# Patient Record
Sex: FEMALE | Race: BLACK OR AFRICAN AMERICAN | Employment: UNEMPLOYED | URBAN - METROPOLITAN AREA
[De-identification: names, ages, dates, MRNs, and addresses within clinical notes are randomized per-mention and may not be internally consistent; named-entity substitution may affect disease eponyms.]

---

## 2017-05-08 ENCOUNTER — HOSPITAL ENCOUNTER (OUTPATIENT)
Dept: ULTRASOUND IMAGING | Age: 42
Discharge: HOME | End: 2017-05-08
Attending: NURSE PRACTITIONER | Admitting: NURSE PRACTITIONER

## 2017-05-08 DIAGNOSIS — R10.11 ABDOMINAL PAIN, RIGHT UPPER QUADRANT: ICD-10-CM

## 2017-06-16 ENCOUNTER — HOSPITAL ENCOUNTER (OUTPATIENT)
Dept: GENERAL RADIOLOGY | Age: 42
Discharge: HOME | End: 2017-06-16
Attending: INTERNAL MEDICINE | Admitting: INTERNAL MEDICINE

## 2017-06-16 LAB
ALBUMIN SERPL-MCNC: 3.9 GM/DL (ref 3.4–5)
ALP BLD-CCNC: 67 IU/L (ref 40–128)
ALT SERPL-CCNC: 15 U/L (ref 10–40)
AMYLASE: 83 U/L (ref 25–115)
ANION GAP SERPL CALCULATED.3IONS-SCNC: 12 MMOL/L (ref 4–16)
AST SERPL-CCNC: 20 IU/L (ref 15–37)
BASOPHILS ABSOLUTE: 0 K/CU MM
BASOPHILS RELATIVE PERCENT: 0.6 % (ref 0–1)
BILIRUB SERPL-MCNC: 0.2 MG/DL (ref 0–1)
BUN BLDV-MCNC: 7 MG/DL (ref 6–23)
C-REACTIVE PROTEIN, HIGH SENSITIVITY: 10.8 MG/L
CALCIUM SERPL-MCNC: 9.8 MG/DL (ref 8.3–10.6)
CHLORIDE BLD-SCNC: 102 MMOL/L (ref 99–110)
CO2: 26 MMOL/L (ref 21–32)
CREAT SERPL-MCNC: 0.9 MG/DL (ref 0.6–1.1)
DIFFERENTIAL TYPE: ABNORMAL
EOSINOPHILS ABSOLUTE: 0.1 K/CU MM
EOSINOPHILS RELATIVE PERCENT: 0.7 % (ref 0–3)
ERYTHROCYTE SEDIMENTATION RATE: 38 MM/HR (ref 0–20)
FERRITIN: 28 NG/ML (ref 15–150)
GFR AFRICAN AMERICAN: >60 ML/MIN/1.73M2
GFR NON-AFRICAN AMERICAN: >60 ML/MIN/1.73M2
GLUCOSE BLD-MCNC: 93 MG/DL (ref 70–140)
HCT VFR BLD CALC: 38 % (ref 37–47)
HEMOGLOBIN: 12.5 GM/DL (ref 12.5–16)
IGM,SERUM: 48 MG/DL (ref 62–277)
IMMATURE NEUTROPHIL %: 0.3 % (ref 0–0.43)
IRON: 70 UG/DL (ref 37–145)
LIPASE: 14 IU/L (ref 13–60)
LYMPHOCYTES ABSOLUTE: 3.8 K/CU MM
LYMPHOCYTES RELATIVE PERCENT: 52.6 % (ref 24–44)
MCH RBC QN AUTO: 31.5 PG (ref 27–31)
MCHC RBC AUTO-ENTMCNC: 32.9 % (ref 32–36)
MCV RBC AUTO: 95.7 FL (ref 78–100)
MONOCYTES ABSOLUTE: 0.3 K/CU MM
MONOCYTES RELATIVE PERCENT: 4.2 % (ref 0–4)
NUCLEATED RBC %: 0 %
PCT TRANSFERRIN: 27 % (ref 10–44)
PDW BLD-RTO: 13.1 % (ref 11.7–14.9)
PLATELET # BLD: 274 K/CU MM (ref 140–440)
PMV BLD AUTO: 11.8 FL (ref 7.5–11.1)
POTASSIUM SERPL-SCNC: 4.5 MMOL/L (ref 3.5–5.1)
RBC # BLD: 3.97 M/CU MM (ref 4.2–5.4)
SEGMENTED NEUTROPHILS ABSOLUTE COUNT: 3 K/CU MM
SEGMENTED NEUTROPHILS RELATIVE PERCENT: 41.6 % (ref 36–66)
SODIUM BLD-SCNC: 140 MMOL/L (ref 135–145)
TOTAL IMMATURE NEUTOROPHIL: 0.02 K/CU MM
TOTAL IRON BINDING CAPACITY: 259 UG/DL (ref 250–450)
TOTAL NUCLEATED RBC: 0 K/CU MM
TOTAL PROTEIN: 7 GM/DL (ref 6.4–8.2)
UNSATURATED IRON BINDING CAPACITY: 189 UG/DL (ref 110–370)
WBC # BLD: 7.2 K/CU MM (ref 4–10.5)

## 2017-06-19 LAB — TISSUE TRANSGLUTAMINASE ANTIBODY: 2

## 2017-06-29 ENCOUNTER — HOSPITAL ENCOUNTER (OUTPATIENT)
Dept: NUCLEAR MEDICINE | Age: 42
Discharge: HOME | End: 2017-06-29
Attending: NURSE PRACTITIONER | Admitting: NURSE PRACTITIONER

## 2017-06-29 DIAGNOSIS — R10.9 ABDOMINAL PAIN, UNSPECIFIED SITE: ICD-10-CM

## 2017-06-29 RX ADMIN — Medication 6 MILLICURIE: at 10:05

## 2017-07-10 ENCOUNTER — HOSPITAL ENCOUNTER (OUTPATIENT)
Dept: GENERAL RADIOLOGY | Age: 42
Discharge: HOME | End: 2017-07-10
Attending: INTERNAL MEDICINE | Admitting: INTERNAL MEDICINE

## 2017-07-10 LAB
ALBUMIN SERPL-MCNC: 3.7 GM/DL (ref 3.4–5)
ALP BLD-CCNC: 69 IU/L (ref 40–128)
ALT SERPL-CCNC: 13 U/L (ref 10–40)
ANION GAP SERPL CALCULATED.3IONS-SCNC: 13 MMOL/L (ref 4–16)
AST SERPL-CCNC: 19 IU/L (ref 15–37)
BASOPHILS ABSOLUTE: 0 K/CU MM
BASOPHILS RELATIVE PERCENT: 0.4 % (ref 0–1)
BILIRUB SERPL-MCNC: 0.2 MG/DL (ref 0–1)
BUN BLDV-MCNC: 6 MG/DL (ref 6–23)
CALCIUM SERPL-MCNC: 9.4 MG/DL (ref 8.3–10.6)
CHLORIDE BLD-SCNC: 100 MMOL/L (ref 99–110)
CHOLESTEROL: 198 MG/DL
CO2: 26 MMOL/L (ref 21–32)
CREAT SERPL-MCNC: 0.8 MG/DL (ref 0.6–1.1)
DIFFERENTIAL TYPE: ABNORMAL
EOSINOPHILS ABSOLUTE: 0.1 K/CU MM
EOSINOPHILS RELATIVE PERCENT: 1.2 % (ref 0–3)
GFR AFRICAN AMERICAN: >60 ML/MIN/1.73M2
GFR NON-AFRICAN AMERICAN: >60 ML/MIN/1.73M2
GLUCOSE FASTING: 87 MG/DL (ref 70–99)
HCT VFR BLD CALC: 38.4 % (ref 37–47)
HDLC SERPL-MCNC: 85 MG/DL
HEMOGLOBIN: 12.5 GM/DL (ref 12.5–16)
IMMATURE NEUTROPHIL %: 0.1 % (ref 0–0.43)
LDL CHOLESTEROL CALCULATED: 98 MG/DL
LYMPHOCYTES ABSOLUTE: 3.7 K/CU MM
LYMPHOCYTES RELATIVE PERCENT: 54.3 % (ref 24–44)
MCH RBC QN AUTO: 31.4 PG (ref 27–31)
MCHC RBC AUTO-ENTMCNC: 32.6 % (ref 32–36)
MCV RBC AUTO: 96.5 FL (ref 78–100)
MONOCYTES ABSOLUTE: 0.3 K/CU MM
MONOCYTES RELATIVE PERCENT: 4.6 % (ref 0–4)
NUCLEATED RBC %: 0 %
PDW BLD-RTO: 13.3 % (ref 11.7–14.9)
PLATELET # BLD: 267 K/CU MM (ref 140–440)
PMV BLD AUTO: 11.2 FL (ref 7.5–11.1)
POTASSIUM SERPL-SCNC: 4.1 MMOL/L (ref 3.5–5.1)
RBC # BLD: 3.98 M/CU MM (ref 4.2–5.4)
SEGMENTED NEUTROPHILS ABSOLUTE COUNT: 2.7 K/CU MM
SEGMENTED NEUTROPHILS RELATIVE PERCENT: 39.4 % (ref 36–66)
SODIUM BLD-SCNC: 139 MMOL/L (ref 135–145)
T4 FREE: 0.93 NG/DL (ref 0.9–1.8)
TOTAL IMMATURE NEUTOROPHIL: 0.01 K/CU MM
TOTAL NUCLEATED RBC: 0 K/CU MM
TOTAL PROTEIN: 6.9 GM/DL (ref 6.4–8.2)
TRIGL SERPL-MCNC: 73 MG/DL
TSH HIGH SENSITIVITY: 0.56 UIU/ML (ref 0.27–4.2)
WBC # BLD: 6.8 K/CU MM (ref 4–10.5)

## 2017-08-09 ENCOUNTER — HOSPITAL ENCOUNTER (OUTPATIENT)
Dept: CARDIOLOGY | Age: 42
Discharge: HOME | End: 2017-08-09
Attending: INTERNAL MEDICINE | Admitting: INTERNAL MEDICINE

## 2017-08-09 DIAGNOSIS — R07.2 CHEST PAIN, PRECORDIAL: ICD-10-CM

## 2017-08-09 DIAGNOSIS — R07.2 PRECORDIAL PAIN: ICD-10-CM

## 2017-08-09 LAB
LV EF: 50 %
LV EF: 70 %
LVEF MODALITY: NORMAL
LVEF MODALITY: NORMAL

## 2017-08-09 RX ORDER — SODIUM CHLORIDE 0.9 % (FLUSH) 0.9 %
10 SYRINGE (ML) INJECTION PRN
Status: DISCONTINUED | OUTPATIENT
Start: 2017-08-09 | End: 2017-08-10 | Stop reason: HOSPADM

## 2017-08-09 RX ADMIN — Medication 30 MILLICURIE: at 11:58

## 2017-08-09 RX ADMIN — Medication 10 ML: at 11:54

## 2017-08-09 RX ADMIN — Medication 10 MILLICURIE: at 11:58

## 2018-09-12 ENCOUNTER — HOSPITAL ENCOUNTER (OUTPATIENT)
Dept: OTHER | Age: 43
Discharge: HOME | End: 2018-09-12
Attending: SURGERY | Admitting: CLINIC/CENTER

## 2018-09-15 LAB
CHLAMYDIA TRACHOMATIS AMPLIFIED DET: NEGATIVE
N GONORRHOEAE AMPLIFIED DET: NEGATIVE
T. VAGINALIS BY TMA: NEGATIVE

## 2019-01-08 ENCOUNTER — APPOINTMENT (OUTPATIENT)
Dept: GENERAL RADIOLOGY | Age: 44
End: 2019-01-08
Payer: COMMERCIAL

## 2019-01-08 ENCOUNTER — APPOINTMENT (OUTPATIENT)
Dept: CT IMAGING | Age: 44
End: 2019-01-08
Payer: COMMERCIAL

## 2019-01-08 ENCOUNTER — HOSPITAL ENCOUNTER (EMERGENCY)
Age: 44
Discharge: HOME | End: 2019-01-08
Attending: EMERGENCY MEDICINE
Payer: COMMERCIAL

## 2019-01-08 VITALS
DIASTOLIC BLOOD PRESSURE: 90 MMHG | TEMPERATURE: 98.2 F | HEART RATE: 86 BPM | HEIGHT: 66 IN | SYSTOLIC BLOOD PRESSURE: 141 MMHG | WEIGHT: 250 LBS | BODY MASS INDEX: 40.18 KG/M2 | OXYGEN SATURATION: 100 % | RESPIRATION RATE: 17 BRPM

## 2019-01-08 DIAGNOSIS — R79.0 LOW MAGNESIUM LEVEL: ICD-10-CM

## 2019-01-08 DIAGNOSIS — Q45.3 PANCREATIC ABNORMALITY: ICD-10-CM

## 2019-01-08 DIAGNOSIS — R10.84 GENERALIZED ABDOMINAL PAIN: ICD-10-CM

## 2019-01-08 DIAGNOSIS — M79.10 GENERALIZED MUSCLE ACHE: Primary | ICD-10-CM

## 2019-01-08 LAB
ALBUMIN SERPL-MCNC: 3.4 GM/DL (ref 3.4–5)
ALP BLD-CCNC: 81 IU/L (ref 40–129)
ALT SERPL-CCNC: 9 U/L (ref 10–40)
ANION GAP SERPL CALCULATED.3IONS-SCNC: 13 MMOL/L (ref 4–16)
AST SERPL-CCNC: 14 IU/L (ref 15–37)
BACTERIA: NEGATIVE /HPF
BASOPHILS ABSOLUTE: 0 K/CU MM
BASOPHILS RELATIVE PERCENT: 0.4 % (ref 0–1)
BILIRUB SERPL-MCNC: 0.1 MG/DL (ref 0–1)
BILIRUBIN URINE: NEGATIVE MG/DL
BLOOD, URINE: NEGATIVE
BUN BLDV-MCNC: 8 MG/DL (ref 6–23)
CALCIUM SERPL-MCNC: 9 MG/DL (ref 8.3–10.6)
CHLORIDE BLD-SCNC: 103 MMOL/L (ref 99–110)
CLARITY: CLEAR
CO2: 22 MMOL/L (ref 21–32)
COLOR: YELLOW
CREAT SERPL-MCNC: 0.7 MG/DL (ref 0.6–1.1)
DIFFERENTIAL TYPE: ABNORMAL
EKG ATRIAL RATE: 93 BPM
EKG DIAGNOSIS: NORMAL
EKG P AXIS: 36 DEGREES
EKG P-R INTERVAL: 140 MS
EKG Q-T INTERVAL: 376 MS
EKG QRS DURATION: 72 MS
EKG QTC CALCULATION (BAZETT): 467 MS
EKG R AXIS: 21 DEGREES
EKG T AXIS: 10 DEGREES
EKG VENTRICULAR RATE: 93 BPM
EOSINOPHILS ABSOLUTE: 0.1 K/CU MM
EOSINOPHILS RELATIVE PERCENT: 1.1 % (ref 0–3)
GFR AFRICAN AMERICAN: >60 ML/MIN/1.73M2
GFR NON-AFRICAN AMERICAN: >60 ML/MIN/1.73M2
GLUCOSE BLD-MCNC: 88 MG/DL (ref 70–99)
GLUCOSE, URINE: NEGATIVE MG/DL
HCG QUALITATIVE: NEGATIVE
HCT VFR BLD CALC: 40.7 % (ref 37–47)
HEMOGLOBIN: 12.5 GM/DL (ref 12.5–16)
IMMATURE NEUTROPHIL %: 0.3 % (ref 0–0.43)
KETONES, URINE: NEGATIVE MG/DL
LEUKOCYTE ESTERASE, URINE: NEGATIVE
LIPASE: 14 IU/L (ref 13–60)
LYMPHOCYTES ABSOLUTE: 4 K/CU MM
LYMPHOCYTES RELATIVE PERCENT: 52.8 % (ref 24–44)
MAGNESIUM: 1.6 MG/DL (ref 1.8–2.4)
MCH RBC QN AUTO: 30.4 PG (ref 27–31)
MCHC RBC AUTO-ENTMCNC: 30.7 % (ref 32–36)
MCV RBC AUTO: 99 FL (ref 78–100)
MONOCYTES ABSOLUTE: 0.3 K/CU MM
MONOCYTES RELATIVE PERCENT: 4.3 % (ref 0–4)
MUCUS: ABNORMAL HPF
NITRITE URINE, QUANTITATIVE: NEGATIVE
NUCLEATED RBC %: 0 %
PDW BLD-RTO: 12.9 % (ref 11.7–14.9)
PH, URINE: 7 (ref 5–8)
PLATELET # BLD: 321 K/CU MM (ref 140–440)
PMV BLD AUTO: 10.5 FL (ref 7.5–11.1)
POTASSIUM SERPL-SCNC: 3.6 MMOL/L (ref 3.5–5.1)
PROTEIN UA: 30 MG/DL
RAPID INFLUENZA  B AGN: NEGATIVE
RAPID INFLUENZA A AGN: NEGATIVE
RBC # BLD: 4.11 M/CU MM (ref 4.2–5.4)
RBC URINE: <1 /HPF (ref 0–6)
SEGMENTED NEUTROPHILS ABSOLUTE COUNT: 3.1 K/CU MM
SEGMENTED NEUTROPHILS RELATIVE PERCENT: 41.1 % (ref 36–66)
SODIUM BLD-SCNC: 138 MMOL/L (ref 135–145)
SPECIFIC GRAVITY UA: 1.02 (ref 1–1.03)
SQUAMOUS EPITHELIAL: <1 /HPF
TOTAL CK: 54 IU/L (ref 26–140)
TOTAL IMMATURE NEUTOROPHIL: 0.02 K/CU MM
TOTAL NUCLEATED RBC: 0 K/CU MM
TOTAL PROTEIN: 7.2 GM/DL (ref 6.4–8.2)
TRICHOMONAS: ABNORMAL /HPF
TROPONIN T: <0.01 NG/ML
UROBILINOGEN, URINE: NORMAL MG/DL (ref 0.2–1)
WBC # BLD: 7.5 K/CU MM (ref 4–10.5)
WBC UA: <1 /HPF (ref 0–5)

## 2019-01-08 PROCEDURE — 96365 THER/PROPH/DIAG IV INF INIT: CPT

## 2019-01-08 PROCEDURE — 2580000003 HC RX 258: Performed by: EMERGENCY MEDICINE

## 2019-01-08 PROCEDURE — 83735 ASSAY OF MAGNESIUM: CPT

## 2019-01-08 PROCEDURE — 82550 ASSAY OF CK (CPK): CPT

## 2019-01-08 PROCEDURE — 6370000000 HC RX 637 (ALT 250 FOR IP): Performed by: EMERGENCY MEDICINE

## 2019-01-08 PROCEDURE — 6360000002 HC RX W HCPCS: Performed by: EMERGENCY MEDICINE

## 2019-01-08 PROCEDURE — 84703 CHORIONIC GONADOTROPIN ASSAY: CPT

## 2019-01-08 PROCEDURE — 74176 CT ABD & PELVIS W/O CONTRAST: CPT

## 2019-01-08 PROCEDURE — 84484 ASSAY OF TROPONIN QUANT: CPT

## 2019-01-08 PROCEDURE — 96375 TX/PRO/DX INJ NEW DRUG ADDON: CPT

## 2019-01-08 PROCEDURE — 93005 ELECTROCARDIOGRAM TRACING: CPT | Performed by: EMERGENCY MEDICINE

## 2019-01-08 PROCEDURE — 36415 COLL VENOUS BLD VENIPUNCTURE: CPT

## 2019-01-08 PROCEDURE — 87804 INFLUENZA ASSAY W/OPTIC: CPT

## 2019-01-08 PROCEDURE — 96361 HYDRATE IV INFUSION ADD-ON: CPT

## 2019-01-08 PROCEDURE — C9113 INJ PANTOPRAZOLE SODIUM, VIA: HCPCS | Performed by: EMERGENCY MEDICINE

## 2019-01-08 PROCEDURE — 93010 ELECTROCARDIOGRAM REPORT: CPT | Performed by: INTERNAL MEDICINE

## 2019-01-08 PROCEDURE — 83690 ASSAY OF LIPASE: CPT

## 2019-01-08 PROCEDURE — 71046 X-RAY EXAM CHEST 2 VIEWS: CPT

## 2019-01-08 PROCEDURE — 81001 URINALYSIS AUTO W/SCOPE: CPT

## 2019-01-08 PROCEDURE — 80053 COMPREHEN METABOLIC PANEL: CPT

## 2019-01-08 PROCEDURE — 85025 COMPLETE CBC W/AUTO DIFF WBC: CPT

## 2019-01-08 PROCEDURE — 99285 EMERGENCY DEPT VISIT HI MDM: CPT

## 2019-01-08 RX ORDER — FEXOFENADINE HCL 180 MG/1
180 TABLET ORAL DAILY
COMMUNITY

## 2019-01-08 RX ORDER — DICYCLOMINE HYDROCHLORIDE 10 MG/1
10 CAPSULE ORAL 4 TIMES DAILY PRN
Qty: 20 CAPSULE | Refills: 0 | Status: SHIPPED | OUTPATIENT
Start: 2019-01-08 | End: 2019-09-12 | Stop reason: ALTCHOICE

## 2019-01-08 RX ORDER — 0.9 % SODIUM CHLORIDE 0.9 %
1000 INTRAVENOUS SOLUTION INTRAVENOUS ONCE
Status: COMPLETED | OUTPATIENT
Start: 2019-01-08 | End: 2019-01-08

## 2019-01-08 RX ORDER — MAGNESIUM SULFATE IN WATER 40 MG/ML
2 INJECTION, SOLUTION INTRAVENOUS ONCE
Status: COMPLETED | OUTPATIENT
Start: 2019-01-08 | End: 2019-01-08

## 2019-01-08 RX ORDER — RANITIDINE 150 MG/1
150 TABLET ORAL 2 TIMES DAILY
COMMUNITY

## 2019-01-08 RX ORDER — PANTOPRAZOLE SODIUM 40 MG/10ML
40 INJECTION, POWDER, LYOPHILIZED, FOR SOLUTION INTRAVENOUS ONCE
Status: COMPLETED | OUTPATIENT
Start: 2019-01-08 | End: 2019-01-08

## 2019-01-08 RX ORDER — NAPROXEN 500 MG/1
500 TABLET ORAL 2 TIMES DAILY PRN
Qty: 20 TABLET | Refills: 0 | Status: SHIPPED | OUTPATIENT
Start: 2019-01-08 | End: 2020-02-11

## 2019-01-08 RX ORDER — KETOROLAC TROMETHAMINE 30 MG/ML
30 INJECTION, SOLUTION INTRAMUSCULAR; INTRAVENOUS ONCE
Status: COMPLETED | OUTPATIENT
Start: 2019-01-08 | End: 2019-01-08

## 2019-01-08 RX ORDER — GUAIFENESIN/DEXTROMETHORPHAN 100-10MG/5
5 SYRUP ORAL ONCE
Status: COMPLETED | OUTPATIENT
Start: 2019-01-08 | End: 2019-01-08

## 2019-01-08 RX ORDER — METHYLPREDNISOLONE 4 MG/1
4 TABLET ORAL SEE ADMIN INSTRUCTIONS
COMMUNITY
End: 2019-09-12 | Stop reason: ALTCHOICE

## 2019-01-08 RX ADMIN — MAGNESIUM SULFATE HEPTAHYDRATE 2 G: 40 INJECTION, SOLUTION INTRAVENOUS at 14:59

## 2019-01-08 RX ADMIN — SODIUM CHLORIDE 1000 ML: 9 INJECTION, SOLUTION INTRAVENOUS at 13:02

## 2019-01-08 RX ADMIN — PANTOPRAZOLE SODIUM 40 MG: 40 INJECTION, POWDER, FOR SOLUTION INTRAVENOUS at 13:02

## 2019-01-08 RX ADMIN — GUAIFENESIN AND DEXTROMETHORPHAN 5 ML: 100; 10 SYRUP ORAL at 13:02

## 2019-01-08 RX ADMIN — KETOROLAC TROMETHAMINE 30 MG: 30 INJECTION, SOLUTION INTRAMUSCULAR at 13:02

## 2019-01-08 ASSESSMENT — PAIN SCALES - GENERAL
PAINLEVEL_OUTOF10: 7
PAINLEVEL_OUTOF10: 7

## 2019-01-08 ASSESSMENT — PAIN DESCRIPTION - DESCRIPTORS: DESCRIPTORS: RADIATING

## 2019-01-08 ASSESSMENT — PAIN DESCRIPTION - LOCATION: LOCATION: ABDOMEN;CHEST

## 2019-01-08 ASSESSMENT — PAIN DESCRIPTION - PAIN TYPE: TYPE: ACUTE PAIN

## 2019-01-08 ASSESSMENT — PAIN DESCRIPTION - FREQUENCY: FREQUENCY: CONTINUOUS

## 2019-01-24 ENCOUNTER — HOSPITAL ENCOUNTER (OUTPATIENT)
Age: 44
Setting detail: SPECIMEN
Discharge: HOME | End: 2019-01-24
Payer: COMMERCIAL

## 2019-01-24 LAB
Lab: NORMAL
REPORT STATUS: NORMAL
SPECIMEN: NORMAL
WET PREP: NORMAL
WET PREP: NORMAL

## 2019-01-24 PROCEDURE — 87086 URINE CULTURE/COLONY COUNT: CPT

## 2019-01-24 PROCEDURE — 87591 N.GONORRHOEAE DNA AMP PROB: CPT

## 2019-01-24 PROCEDURE — 87661 TRICHOMONAS VAGINALIS AMPLIF: CPT

## 2019-01-24 PROCEDURE — 87491 CHLMYD TRACH DNA AMP PROBE: CPT

## 2019-01-24 PROCEDURE — 87220 TISSUE EXAM FOR FUNGI: CPT

## 2019-01-26 LAB
CULTURE: NORMAL
Lab: NORMAL
REPORT STATUS: NORMAL
SPECIMEN: NORMAL

## 2019-04-09 ENCOUNTER — HOSPITAL ENCOUNTER (OUTPATIENT)
Age: 44
Discharge: HOME | End: 2019-04-09
Payer: COMMERCIAL

## 2019-04-09 ENCOUNTER — HOSPITAL ENCOUNTER (OUTPATIENT)
Dept: GENERAL RADIOLOGY | Age: 44
Discharge: HOME | End: 2019-04-09
Payer: COMMERCIAL

## 2019-04-09 DIAGNOSIS — M25.521 RIGHT ELBOW PAIN: ICD-10-CM

## 2019-04-09 PROCEDURE — 73080 X-RAY EXAM OF ELBOW: CPT

## 2019-09-12 ENCOUNTER — HOSPITAL ENCOUNTER (OUTPATIENT)
Dept: SLEEP CENTER | Age: 44
Discharge: HOME | End: 2019-09-12
Payer: COMMERCIAL

## 2019-09-12 VITALS
HEART RATE: 81 BPM | HEIGHT: 67 IN | SYSTOLIC BLOOD PRESSURE: 117 MMHG | WEIGHT: 242.7 LBS | BODY MASS INDEX: 38.09 KG/M2 | DIASTOLIC BLOOD PRESSURE: 77 MMHG | OXYGEN SATURATION: 100 %

## 2019-09-12 DIAGNOSIS — G47.19 EXCESSIVE DAYTIME SLEEPINESS: ICD-10-CM

## 2019-09-12 DIAGNOSIS — E66.9 OBESITY (BMI 30-39.9): ICD-10-CM

## 2019-09-12 DIAGNOSIS — G47.33 OSA (OBSTRUCTIVE SLEEP APNEA): ICD-10-CM

## 2019-09-12 PROCEDURE — 99211 OFF/OP EST MAY X REQ PHY/QHP: CPT | Performed by: INTERNAL MEDICINE

## 2019-09-12 PROCEDURE — 99204 OFFICE O/P NEW MOD 45 MIN: CPT | Performed by: INTERNAL MEDICINE

## 2019-09-12 RX ORDER — TRAZODONE HYDROCHLORIDE 50 MG/1
TABLET ORAL
Refills: 2 | COMMUNITY
Start: 2019-08-31

## 2019-09-12 RX ORDER — SUMATRIPTAN 25 MG/1
TABLET, FILM COATED ORAL
Refills: 0 | COMMUNITY
Start: 2019-08-31

## 2019-09-12 RX ORDER — SKIN PROTECTANT 44 G/100G
OINTMENT TOPICAL
Refills: 1 | COMMUNITY
Start: 2019-08-31

## 2019-09-12 RX ORDER — FLUTICASONE PROPIONATE 50 MCG
SPRAY, SUSPENSION (ML) NASAL
Refills: 2 | COMMUNITY
Start: 2019-08-31

## 2019-09-12 ASSESSMENT — SLEEP AND FATIGUE QUESTIONNAIRES
HOW LIKELY ARE YOU TO NOD OFF OR FALL ASLEEP WHILE WATCHING TV: 1
HOW LIKELY ARE YOU TO NOD OFF OR FALL ASLEEP WHILE SITTING QUIETLY AFTER LUNCH WITHOUT ALCOHOL: 1
HOW LIKELY ARE YOU TO NOD OFF OR FALL ASLEEP WHEN YOU ARE A PASSENGER IN A CAR FOR AN HOUR WITHOUT A BREAK: 1
HOW LIKELY ARE YOU TO NOD OFF OR FALL ASLEEP WHILE SITTING INACTIVE IN A PUBLIC PLACE: 0
HOW LIKELY ARE YOU TO NOD OFF OR FALL ASLEEP WHILE LYING DOWN TO REST IN THE AFTERNOON WHEN CIRCUMSTANCES PERMIT: 1
ESS TOTAL SCORE: 5
HOW LIKELY ARE YOU TO NOD OFF OR FALL ASLEEP IN A CAR, WHILE STOPPED FOR A FEW MINUTES IN TRAFFIC: 0
HOW LIKELY ARE YOU TO NOD OFF OR FALL ASLEEP WHILE SITTING AND READING: 1
HOW LIKELY ARE YOU TO NOD OFF OR FALL ASLEEP WHILE SITTING AND TALKING TO SOMEONE: 0

## 2019-09-16 ENCOUNTER — HOSPITAL ENCOUNTER (OUTPATIENT)
Dept: SLEEP CENTER | Age: 44
Discharge: HOME | End: 2019-09-16
Payer: COMMERCIAL

## 2019-09-16 DIAGNOSIS — G47.33 OSA (OBSTRUCTIVE SLEEP APNEA): ICD-10-CM

## 2019-09-16 PROCEDURE — 95810 POLYSOM 6/> YRS 4/> PARAM: CPT

## 2019-09-16 PROCEDURE — 95810 POLYSOM 6/> YRS 4/> PARAM: CPT | Performed by: INTERNAL MEDICINE

## 2019-09-18 LAB — STATUS: NORMAL

## 2019-10-10 ENCOUNTER — HOSPITAL ENCOUNTER (OUTPATIENT)
Dept: SLEEP CENTER | Age: 44
Discharge: HOME | End: 2019-10-10
Payer: COMMERCIAL

## 2019-10-10 DIAGNOSIS — G47.33 OSA (OBSTRUCTIVE SLEEP APNEA): ICD-10-CM

## 2019-10-10 DIAGNOSIS — E66.9 OBESITY (BMI 30-39.9): ICD-10-CM

## 2019-10-10 DIAGNOSIS — G47.19 EXCESSIVE DAYTIME SLEEPINESS: ICD-10-CM

## 2019-10-10 PROCEDURE — 99214 OFFICE O/P EST MOD 30 MIN: CPT | Performed by: INTERNAL MEDICINE

## 2019-10-10 PROCEDURE — 9990000010 HC NO CHARGE VISIT: Performed by: INTERNAL MEDICINE

## 2019-10-10 ASSESSMENT — ENCOUNTER SYMPTOMS
EYE DISCHARGE: 0
EYE ITCHING: 0
ABDOMINAL DISTENTION: 0
ABDOMINAL PAIN: 0
SHORTNESS OF BREATH: 0
COUGH: 0
BACK PAIN: 0

## 2019-10-17 ENCOUNTER — HOSPITAL ENCOUNTER (OUTPATIENT)
Dept: SLEEP CENTER | Age: 44
Discharge: HOME | End: 2019-10-17
Payer: COMMERCIAL

## 2019-10-17 DIAGNOSIS — G47.33 OSA (OBSTRUCTIVE SLEEP APNEA): ICD-10-CM

## 2019-10-17 PROCEDURE — 95811 POLYSOM 6/>YRS CPAP 4/> PARM: CPT

## 2019-10-23 LAB — STATUS: NORMAL

## 2019-10-23 PROCEDURE — 95811 POLYSOM 6/>YRS CPAP 4/> PARM: CPT | Performed by: INTERNAL MEDICINE

## 2020-02-11 ENCOUNTER — HOSPITAL ENCOUNTER (EMERGENCY)
Age: 45
Discharge: HOME | End: 2020-02-11
Payer: COMMERCIAL

## 2020-02-11 ENCOUNTER — APPOINTMENT (OUTPATIENT)
Dept: GENERAL RADIOLOGY | Age: 45
End: 2020-02-11
Payer: COMMERCIAL

## 2020-02-11 VITALS
OXYGEN SATURATION: 98 % | SYSTOLIC BLOOD PRESSURE: 124 MMHG | TEMPERATURE: 98.2 F | RESPIRATION RATE: 20 BRPM | HEIGHT: 67 IN | BODY MASS INDEX: 38.61 KG/M2 | HEART RATE: 94 BPM | WEIGHT: 246 LBS | DIASTOLIC BLOOD PRESSURE: 73 MMHG

## 2020-02-11 PROCEDURE — 99283 EMERGENCY DEPT VISIT LOW MDM: CPT

## 2020-02-11 PROCEDURE — 73610 X-RAY EXAM OF ANKLE: CPT

## 2020-02-11 RX ORDER — NAPROXEN 500 MG/1
500 TABLET ORAL 2 TIMES DAILY PRN
Qty: 20 TABLET | Refills: 0 | Status: SHIPPED | OUTPATIENT
Start: 2020-02-11 | End: 2021-05-06

## 2020-02-11 ASSESSMENT — PAIN DESCRIPTION - ORIENTATION: ORIENTATION: LEFT

## 2020-02-11 ASSESSMENT — PAIN DESCRIPTION - PAIN TYPE: TYPE: ACUTE PAIN

## 2020-02-11 ASSESSMENT — PAIN SCALES - GENERAL: PAINLEVEL_OUTOF10: 7

## 2020-02-11 ASSESSMENT — PAIN DESCRIPTION - LOCATION: LOCATION: ANKLE

## 2020-02-12 NOTE — ED PROVIDER NOTES
eMERGENCY dEPARTMENT eNCOUnter        [address] [facility]    Chief Complaint   Patient presents with    Ankle Pain     left sided; slipped and fell 2 days ago       HPI    Isabella Berry is a 38 y.o. female who presents with ankle pain. Onset was 2 days ago. Context was patient slipped and fell on ice. Pain is localized to the left ankle. Constant since onset. Characterized as aching. Aggravated by bearing weight and movement, alleviated by nothing. Severity of the pain is a 7 on a scale of 0-10. Denies other injuries. REVIEW OF SYSTEMS    See HPI for further details. Review of systems otherwise negative. Respiratory:  Denies any shortness of breath. Cardiovascular:  Denies chest pain. Musculoskeletal:  + ankle pain  Integument:  Denies erythema or cyanosis. Neurologic:  Denies any numbness or tingling.       PAST MEDICAL HISTORY    Past Medical History:   Diagnosis Date    Helicobacter pylori (H. pylori) 8/27/12    Migraines     Reflux        SURGICAL HISTORY    Past Surgical History:   Procedure Laterality Date    ABDOMEN SURGERY      TUBAL LIGATION         CURRENT MEDICATIONS    Current Outpatient Rx   Medication Sig Dispense Refill    naproxen (NAPROSYN) 500 MG tablet Take 1 tablet by mouth 2 times daily as needed for Pain 20 tablet 0    SUMAtriptan (IMITREX) 25 MG tablet   0    Emollient (DERMAPHOR) OINT ointment   1    fluticasone (FLONASE) 50 MCG/ACT nasal spray   2    traZODone (DESYREL) 50 MG tablet   2    fexofenadine (ALLEGRA ALLERGY) 180 MG tablet Take 180 mg by mouth daily      ranitidine (ZANTAC) 150 MG tablet Take 150 mg by mouth 2 times daily         ALLERGIES    No Known Allergies    FAMILY HISTORY    Family History   Problem Relation Age of Onset    Depression Mother     Stroke Father     Alcohol Abuse Maternal Uncle     Cancer Maternal Uncle         throat    Diabetes Maternal Grandmother     High Blood Pressure Maternal Grandmother     Mental Retardation

## 2020-03-02 ENCOUNTER — HOSPITAL ENCOUNTER (EMERGENCY)
Age: 45
Discharge: HOME | End: 2020-03-02
Attending: EMERGENCY MEDICINE
Payer: COMMERCIAL

## 2020-03-02 VITALS
TEMPERATURE: 98.3 F | HEART RATE: 96 BPM | DIASTOLIC BLOOD PRESSURE: 78 MMHG | OXYGEN SATURATION: 100 % | RESPIRATION RATE: 16 BRPM | HEIGHT: 67 IN | SYSTOLIC BLOOD PRESSURE: 154 MMHG | BODY MASS INDEX: 39.87 KG/M2 | WEIGHT: 254 LBS

## 2020-03-02 PROCEDURE — 6370000000 HC RX 637 (ALT 250 FOR IP): Performed by: PHYSICIAN ASSISTANT

## 2020-03-02 PROCEDURE — 99283 EMERGENCY DEPT VISIT LOW MDM: CPT

## 2020-03-02 RX ORDER — ONDANSETRON 4 MG/1
4 TABLET, ORALLY DISINTEGRATING ORAL EVERY 8 HOURS PRN
Qty: 15 TABLET | Refills: 0 | Status: SHIPPED | OUTPATIENT
Start: 2020-03-02

## 2020-03-02 RX ORDER — DEXTROMETHORPHAN HYDROBROMIDE AND PROMETHAZINE HYDROCHLORIDE 15; 6.25 MG/5ML; MG/5ML
5 SYRUP ORAL 4 TIMES DAILY PRN
Qty: 90 ML | Refills: 0 | Status: SHIPPED | OUTPATIENT
Start: 2020-03-02 | End: 2020-03-09

## 2020-03-02 RX ORDER — ONDANSETRON 4 MG/1
4 TABLET, ORALLY DISINTEGRATING ORAL ONCE
Status: COMPLETED | OUTPATIENT
Start: 2020-03-02 | End: 2020-03-02

## 2020-03-02 RX ADMIN — ONDANSETRON 4 MG: 4 TABLET, ORALLY DISINTEGRATING ORAL at 12:25

## 2020-03-02 NOTE — ED PROVIDER NOTES
(ZOFRAN ODT) 4 MG disintegrating tablet Take 1 tablet by mouth every 8 hours as needed for Nausea 15 tablet 0    promethazine-dextromethorphan (PROMETHAZINE-DM) 6.25-15 MG/5ML syrup Take 5 mLs by mouth 4 times daily as needed for Cough 90 mL 0    naproxen (NAPROSYN) 500 MG tablet Take 1 tablet by mouth 2 times daily as needed for Pain 20 tablet 0    SUMAtriptan (IMITREX) 25 MG tablet   0    Emollient (DERMAPHOR) OINT ointment   1    fluticasone (FLONASE) 50 MCG/ACT nasal spray   2    traZODone (DESYREL) 50 MG tablet   2    fexofenadine (ALLEGRA ALLERGY) 180 MG tablet Take 180 mg by mouth daily      ranitidine (ZANTAC) 150 MG tablet Take 150 mg by mouth 2 times daily         ALLERGIES    No Known Allergies    SOCIAL AND FAMILY HISTORY    Social History     Socioeconomic History    Marital status:      Spouse name: None    Number of children: None    Years of education: None    Highest education level: None   Occupational History    None   Social Needs    Financial resource strain: None    Food insecurity:     Worry: None     Inability: None    Transportation needs:     Medical: None     Non-medical: None   Tobacco Use    Smoking status: Never Smoker    Smokeless tobacco: Never Used   Substance and Sexual Activity    Alcohol use: No    Drug use: No    Sexual activity: None   Lifestyle    Physical activity:     Days per week: None     Minutes per session: None    Stress: None   Relationships    Social connections:     Talks on phone: None     Gets together: None     Attends Mosque service: None     Active member of club or organization: None     Attends meetings of clubs or organizations: None     Relationship status: None    Intimate partner violence:     Fear of current or ex partner: None     Emotionally abused: None     Physically abused: None     Forced sexual activity: None   Other Topics Concern    None   Social History Narrative    None     Family History   Problem Relation Age of Onset    Depression Mother     Stroke Father     Alcohol Abuse Maternal Uncle     Cancer Maternal Uncle         throat    Diabetes Maternal Grandmother     High Blood Pressure Maternal Grandmother     Mental Retardation Maternal Cousin     Allergy (Severe) Other     Learning Disabilities Other         son         PHYSICAL EXAM    VITAL SIGNS: BP (!) 154/78   Pulse 96   Temp 98.3 °F (36.8 °C) (Oral)   Resp 16   Ht 5' 7\" (1.702 m)   Wt 254 lb (115.2 kg)   LMP 02/07/2020   SpO2 100%   BMI 39.78 kg/m²    Constitutional:  Well developed, Well nourished no acute distress  HENT:  Normocephalic, Atraumatic, PERRL. EOMI. Sclera clear. Conjunctiva normal, No discharge. Oropharynx pink, no exudate or tonsillar hypertrophy, clear discharge from bilateral nares, external auditory canals occluded by cerumen  Neck/Lymphatics: supple, no JVD, no swollen nodes  Cardiovascular:  Rate 96, nml Rhythm,  no murmurs/rubs/gallops. No carotid bruits or murmurs heard in carotids. No JVD  Respiratory:  Nonlabored breathing. Normal breath sounds, No wheezing  Abdomen: Bowel sounds normal, Soft, No tenderness, no masses. Musculoskeletal:    There is no edema, asymmetry, or calf / thigh tenderness bilaterally. No cyanosis. No cool or pale-appearing limb. Distal cap refill and pulses intact bilateral upper and lower extremities  Bilateral upper and lower extremity ROM intact without pain or obvious deficit  Integument:  Warm, Dry  Neurologic: Alert & oriented , No focal deficits noted. Cranial nerves II through XII grossly intact. Normal gross motor coordination & motor strength bilateral upper and lower extremities  Sensation intact. Psychiatric:  Affect normal, Mood normal.     Pt elects to hold on chest x-ray today and understands that I am unable to fully evaluate for presenting symptoms by omiting this.   The patient understands they may return to the ED at any time, without penalty or recrimminations, for reevaluation and to have it done. ED COURSE & MEDICAL DECISION MAKING       Patient presents as above for 4-day history of illness. I suspect viral etiology. No clinical signs of pneumonia or strep pharyngitis, other than elevated blood pressure reading she has  normal vital signs, likely influenza or other virus. Patient provided with Zofran and  Offered p.o. fluids which she tolerated. She will be provided with Zofran and cough medicine and advised to follow-up  with primary care. She agrees with plan of care at this time, ample time allotted for answering questions return precautions reviewed at length. Patient agrees to return emergency department if symptoms worsen or any new symptoms develop. Vital signs and nursing notes reviewed during ED course. Clinical  IMPRESSION    1. Viral upper respiratory tract infection    2. Non-intractable vomiting with nausea, unspecified vomiting type              Comment: Please note this report has been produced using speech recognition software and may contain errors related to that system including errors in grammar, punctuation, and spelling, as well as words and phrases that may be inappropriate. If there are any questions or concerns please feel free to contact the dictating provider for clarification.          [place], [address]  03/02/20 1127

## 2020-03-02 NOTE — ED PROVIDER NOTES
including errors in grammar, punctuation, and spelling, as well as words and phrases that may be inappropriate. If there are any questions or concerns please feel free to contact the dictating provider for clarification.         Isa Cheung MD  03/02/20 0327

## 2020-05-15 ENCOUNTER — APPOINTMENT (OUTPATIENT)
Dept: GENERAL RADIOLOGY | Age: 45
End: 2020-05-15
Payer: COMMERCIAL

## 2020-05-15 ENCOUNTER — HOSPITAL ENCOUNTER (EMERGENCY)
Age: 45
Discharge: HOME | End: 2020-05-16
Payer: COMMERCIAL

## 2020-05-15 ENCOUNTER — APPOINTMENT (OUTPATIENT)
Dept: CT IMAGING | Age: 45
End: 2020-05-15
Payer: COMMERCIAL

## 2020-05-15 LAB
ALBUMIN SERPL-MCNC: 3.9 GM/DL (ref 3.4–5)
ALP BLD-CCNC: 65 IU/L (ref 40–129)
ALT SERPL-CCNC: 9 U/L (ref 10–40)
ANION GAP SERPL CALCULATED.3IONS-SCNC: 14 MMOL/L (ref 4–16)
AST SERPL-CCNC: 19 IU/L (ref 15–37)
BACTERIA: NEGATIVE /HPF
BASOPHILS ABSOLUTE: 0 K/CU MM
BASOPHILS RELATIVE PERCENT: 0.2 % (ref 0–1)
BILIRUB SERPL-MCNC: 0.1 MG/DL (ref 0–1)
BILIRUBIN URINE: NEGATIVE MG/DL
BLOOD, URINE: NEGATIVE
BUN BLDV-MCNC: 6 MG/DL (ref 6–23)
CALCIUM SERPL-MCNC: 8.6 MG/DL (ref 8.3–10.6)
CHLORIDE BLD-SCNC: 100 MMOL/L (ref 99–110)
CLARITY: ABNORMAL
CO2: 19 MMOL/L (ref 21–32)
COLOR: YELLOW
CREAT SERPL-MCNC: 0.7 MG/DL (ref 0.6–1.1)
DIFFERENTIAL TYPE: ABNORMAL
EOSINOPHILS ABSOLUTE: 0 K/CU MM
EOSINOPHILS RELATIVE PERCENT: 0 % (ref 0–3)
GFR AFRICAN AMERICAN: >60 ML/MIN/1.73M2
GFR NON-AFRICAN AMERICAN: >60 ML/MIN/1.73M2
GLUCOSE BLD-MCNC: 103 MG/DL (ref 70–99)
GLUCOSE, URINE: NEGATIVE MG/DL
HCT VFR BLD CALC: 40 % (ref 37–47)
HEMOGLOBIN: 13 GM/DL (ref 12.5–16)
IMMATURE NEUTROPHIL %: 0.2 % (ref 0–0.43)
KETONES, URINE: NEGATIVE MG/DL
LACTATE: 1.7 MMOL/L (ref 0.4–2)
LEUKOCYTE ESTERASE, URINE: NEGATIVE
LYMPHOCYTES ABSOLUTE: 2.2 K/CU MM
LYMPHOCYTES RELATIVE PERCENT: 35.9 % (ref 24–44)
MCH RBC QN AUTO: 31.3 PG (ref 27–31)
MCHC RBC AUTO-ENTMCNC: 32.5 % (ref 32–36)
MCV RBC AUTO: 96.4 FL (ref 78–100)
MONOCYTES ABSOLUTE: 0.4 K/CU MM
MONOCYTES RELATIVE PERCENT: 6.8 % (ref 0–4)
MUCUS: ABNORMAL HPF
NITRITE URINE, QUANTITATIVE: NEGATIVE
NUCLEATED RBC %: 0 %
PDW BLD-RTO: 13.6 % (ref 11.7–14.9)
PH, URINE: 8 (ref 5–8)
PLATELET # BLD: 260 K/CU MM (ref 140–440)
PMV BLD AUTO: 11.4 FL (ref 7.5–11.1)
POTASSIUM SERPL-SCNC: 3.8 MMOL/L (ref 3.5–5.1)
PROTEIN UA: 30 MG/DL
RBC # BLD: 4.15 M/CU MM (ref 4.2–5.4)
RBC URINE: 10 /HPF (ref 0–6)
REASON FOR REJECTION: NORMAL
REASON FOR REJECTION: NORMAL
REJECTED TEST: NORMAL
REJECTED TEST: NORMAL
SEGMENTED NEUTROPHILS ABSOLUTE COUNT: 3.4 K/CU MM
SEGMENTED NEUTROPHILS RELATIVE PERCENT: 56.9 % (ref 36–66)
SODIUM BLD-SCNC: 133 MMOL/L (ref 135–145)
SPECIFIC GRAVITY UA: 1.02 (ref 1–1.03)
SQUAMOUS EPITHELIAL: 33 /HPF
TOTAL IMMATURE NEUTOROPHIL: 0.01 K/CU MM
TOTAL NUCLEATED RBC: 0 K/CU MM
TOTAL PROTEIN: 6.6 GM/DL (ref 6.4–8.2)
TRANSITIONAL EPITHELIAL: <1 /HPF
TRICHOMONAS: ABNORMAL /HPF
TROPONIN T: <0.01 NG/ML
UROBILINOGEN, URINE: NORMAL MG/DL (ref 0.2–1)
WBC # BLD: 6 K/CU MM (ref 4–10.5)
WBC UA: 7 /HPF (ref 0–5)

## 2020-05-15 PROCEDURE — 81001 URINALYSIS AUTO W/SCOPE: CPT

## 2020-05-15 PROCEDURE — 71045 X-RAY EXAM CHEST 1 VIEW: CPT

## 2020-05-15 PROCEDURE — 80053 COMPREHEN METABOLIC PANEL: CPT

## 2020-05-15 PROCEDURE — 84484 ASSAY OF TROPONIN QUANT: CPT

## 2020-05-15 PROCEDURE — 85025 COMPLETE CBC W/AUTO DIFF WBC: CPT

## 2020-05-15 PROCEDURE — 2580000003 HC RX 258: Performed by: PHYSICIAN ASSISTANT

## 2020-05-15 PROCEDURE — 71275 CT ANGIOGRAPHY CHEST: CPT

## 2020-05-15 PROCEDURE — 99284 EMERGENCY DEPT VISIT MOD MDM: CPT

## 2020-05-15 PROCEDURE — 83605 ASSAY OF LACTIC ACID: CPT

## 2020-05-15 PROCEDURE — 6360000004 HC RX CONTRAST MEDICATION: Performed by: PHYSICIAN ASSISTANT

## 2020-05-15 PROCEDURE — 93005 ELECTROCARDIOGRAM TRACING: CPT | Performed by: PHYSICIAN ASSISTANT

## 2020-05-15 PROCEDURE — 6370000000 HC RX 637 (ALT 250 FOR IP): Performed by: PHYSICIAN ASSISTANT

## 2020-05-15 RX ORDER — ACETAMINOPHEN 500 MG
1000 TABLET ORAL ONCE
Status: COMPLETED | OUTPATIENT
Start: 2020-05-15 | End: 2020-05-15

## 2020-05-15 RX ORDER — SODIUM CHLORIDE 0.9 % (FLUSH) 0.9 %
10 SYRINGE (ML) INJECTION 2 TIMES DAILY
Status: DISCONTINUED | OUTPATIENT
Start: 2020-05-15 | End: 2020-05-16 | Stop reason: HOSPADM

## 2020-05-15 RX ADMIN — IOPAMIDOL 80 ML: 755 INJECTION, SOLUTION INTRAVENOUS at 23:07

## 2020-05-15 RX ADMIN — ACETAMINOPHEN 1000 MG: 500 TABLET ORAL at 19:32

## 2020-05-15 RX ADMIN — SODIUM CHLORIDE, PRESERVATIVE FREE 10 ML: 5 INJECTION INTRAVENOUS at 23:07

## 2020-05-15 ASSESSMENT — PAIN SCALES - GENERAL: PAINLEVEL_OUTOF10: 7

## 2020-05-15 ASSESSMENT — PAIN DESCRIPTION - PAIN TYPE: TYPE: ACUTE PAIN

## 2020-05-15 NOTE — ED PROVIDER NOTES
12 lead EKG per my interpretation:  Sinus Tachycardia at 115  Axis is   Normal  QTc is  within an acceptable range  There is no specific T wave changes appreciated. There is no specific ST wave changes appreciated. No STEMI    Prior EKG to compare with was available and no clinically significant change in overall morphology when compared to prior.     Magy Holguin MD  05/15/20 Inga Lang

## 2020-05-15 NOTE — ED PROVIDER NOTES
Tobacco Use    Smoking status: Never Smoker    Smokeless tobacco: Never Used   Substance and Sexual Activity    Alcohol use: No    Drug use: No    Sexual activity: Not on file   Lifestyle    Physical activity     Days per week: Not on file     Minutes per session: Not on file    Stress: Not on file   Relationships    Social connections     Talks on phone: Not on file     Gets together: Not on file     Attends Baptism service: Not on file     Active member of club or organization: Not on file     Attends meetings of clubs or organizations: Not on file     Relationship status: Not on file    Intimate partner violence     Fear of current or ex partner: Not on file     Emotionally abused: Not on file     Physically abused: Not on file     Forced sexual activity: Not on file   Other Topics Concern    Not on file   Social History Narrative    Not on file       Medications/Allergies     Discharge Medication List as of 5/16/2020 12:57 AM      CONTINUE these medications which have NOT CHANGED    Details   ondansetron (ZOFRAN ODT) 4 MG disintegrating tablet Take 1 tablet by mouth every 8 hours as needed for Nausea, Disp-15 tablet, R-0Print      naproxen (NAPROSYN) 500 MG tablet Take 1 tablet by mouth 2 times daily as needed for Pain, Disp-20 tablet, R-0Print      SUMAtriptan (IMITREX) 25 MG tablet R-0Historical Med      Emollient [facility]) OINT ointment Starting Sat 8/31/2019, R-1, Historical Med      fluticasone (FLONASE) 50 MCG/ACT nasal spray R-2Historical Med      traZODone (DESYREL) 50 MG tablet R-2Historical Med      fexofenadine (ALLEGRA ALLERGY) 180 MG tablet Take 180 mg by mouth dailyHistorical Med      ranitidine (ZANTAC) 150 MG tablet Take 150 mg by mouth 2 times dailyHistorical Med           No Known Allergies     Physical Exam       ED Triage Vitals [05/15/20 1742]   BP Temp Temp Source Pulse Resp SpO2 Height Weight   132/81 101.4 °F (38.6 °C) Oral 110 18 98 % 5' 7\" (1.702 m) 235 lb (106.6 kg) - 6 /HPF    WBC, UA 7 (H) 0 - 5 /HPF    Bacteria, UA NEGATIVE NEGATIVE /HPF    Squam Epithel, UA 33 /HPF    Trans Epithel, UA <1 /HPF    Mucus, UA RARE (A) NEGATIVE HPF    Trichomonas, UA NONE SEEN NONE SEEN /HPF   SPECIMEN REJECTION   Result Value Ref Range    Rejected Test TROT     Reason for Rejection UNABLE TO PERFORM TESTING:    SPECIMEN REJECTION   Result Value Ref Range    Rejected Test CMPX     Reason for Rejection UNABLE TO PERFORM TESTING:    Troponin   Result Value Ref Range    Troponin T <0.010 <0.01 NG/ML   Comprehensive Metabolic Panel w/ Reflex to MG   Result Value Ref Range    Sodium 133 (L) 135 - 145 MMOL/L    Potassium 3.8 3.5 - 5.1 MMOL/L    Chloride 100 99 - 110 mMol/L    CO2 19 (L) 21 - 32 MMOL/L    BUN 6 6 - 23 MG/DL    CREATININE 0.7 0.6 - 1.1 MG/DL    Glucose 103 (H) 70 - 99 MG/DL    Calcium 8.6 8.3 - 10.6 MG/DL    Alb 3.9 3.4 - 5.0 GM/DL    Total Protein 6.6 6.4 - 8.2 GM/DL    Total Bilirubin 0.1 0.0 - 1.0 MG/DL    ALT 9 (L) 10 - 40 U/L    AST 19 15 - 37 IU/L    Alkaline Phosphatase 65 40 - 129 IU/L    GFR Non-African American >60 >60 mL/min/1.73m2    GFR African American >60 >60 mL/min/1.73m2    Anion Gap 14 4 - 16   EKG 12 Lead   Result Value Ref Range    Ventricular Rate 115 BPM    Atrial Rate 115 BPM    P-R Interval 138 ms    QRS Duration 76 ms    Q-T Interval 318 ms    QTc Calculation (Bazett) 439 ms    P Axis 34 degrees    R Axis 14 degrees    T Axis 8 degrees    Diagnosis       Sinus tachycardia  Possible Left atrial enlargement  Cannot rule out Anterior infarct , age undetermined  Abnormal ECG  When compared with ECG of 08-JAN-2019 12:12,  No significant change was found         Radiographs:  Xr Chest Portable    Result Date: 5/15/2020  EXAMINATION: ONE XRAY VIEW OF THE CHEST 5/15/2020 7:25 pm COMPARISON: None.  HISTORY: ORDERING SYSTEM PROVIDED HISTORY: cough TECHNOLOGIST PROVIDED HISTORY: Reason for exam:->cough Reason for Exam: cough Acuity: Unknown Type of Exam: Initial Relevant

## 2020-05-16 VITALS
WEIGHT: 235 LBS | DIASTOLIC BLOOD PRESSURE: 72 MMHG | BODY MASS INDEX: 36.88 KG/M2 | SYSTOLIC BLOOD PRESSURE: 99 MMHG | TEMPERATURE: 98.2 F | RESPIRATION RATE: 17 BRPM | HEART RATE: 100 BPM | OXYGEN SATURATION: 98 % | HEIGHT: 67 IN

## 2020-05-16 PROCEDURE — 93010 ELECTROCARDIOGRAM REPORT: CPT | Performed by: INTERNAL MEDICINE

## 2020-05-16 PROCEDURE — 6370000000 HC RX 637 (ALT 250 FOR IP): Performed by: PHYSICIAN ASSISTANT

## 2020-05-16 PROCEDURE — U0002 COVID-19 LAB TEST NON-CDC: HCPCS

## 2020-05-16 RX ORDER — GUAIFENESIN AND CODEINE PHOSPHATE 100; 10 MG/5ML; MG/5ML
5 SOLUTION ORAL 3 TIMES DAILY PRN
Qty: 120 ML | Refills: 0 | Status: SHIPPED | OUTPATIENT
Start: 2020-05-16 | End: 2020-05-19

## 2020-05-16 RX ORDER — ALBUTEROL SULFATE 90 UG/1
2 AEROSOL, METERED RESPIRATORY (INHALATION) EVERY 6 HOURS PRN
Qty: 1 INHALER | Refills: 0 | Status: SHIPPED | OUTPATIENT
Start: 2020-05-16

## 2020-05-16 RX ORDER — AZITHROMYCIN 250 MG/1
500 TABLET, FILM COATED ORAL ONCE
Status: COMPLETED | OUTPATIENT
Start: 2020-05-16 | End: 2020-05-16

## 2020-05-16 RX ORDER — AZITHROMYCIN 250 MG/1
TABLET, FILM COATED ORAL
Qty: 4 TABLET | Refills: 0 | Status: SHIPPED | OUTPATIENT
Start: 2020-05-16 | End: 2020-05-20

## 2020-05-16 RX ADMIN — AZITHROMYCIN 500 MG: 250 TABLET, FILM COATED ORAL at 01:07

## 2020-05-18 ENCOUNTER — TELEPHONE (OUTPATIENT)
Dept: OTHER | Facility: CLINIC | Age: 45
End: 2020-05-18

## 2020-05-18 LAB
SARS-COV-2: DETECTED
SOURCE: ABNORMAL

## 2020-05-18 NOTE — TELEPHONE ENCOUNTER
Patient contacted regarding recent visit for viral symptoms. This Suresh Torrez contacted the patient by telephone to perform post discharge call. Verified name and  with patient as identifiers. Provided introduction to self, and reason for call due to viral symptoms of infection and/or exposure to COVID-19. Patient presented to emergency department/flu clinic with complaints of viral symptoms/exposure to COVID. Patient reports symptoms are the same. Due to no new or worsening symptoms the RN CTN/ACM was not notified for escalation. Discussed exposure protocols and quarantine with CDC Guidelines What To Do If You Are Sick    Patient was given an opportunity for questions and concerns. Stay home except to get medical care    Separate yourself from other people and animals in your home    Call ahead before visiting your doctor    Wear a facemask    Cover your coughs and sneezes    Clean your hands often    Avoid sharing personal household items    Clean all high-touch surfaces everyday    Monitor your symptoms  Seek prompt medical attention if your illness is worsening (e.g., difficulty breathing). Before seeking care, call your healthcare provider and tell them that you have, or are being evaluated for, COVID-19. Put on a facemask before you enter the facility. These steps will help the healthcare provider's office to keep other people in the office or waiting room from getting infected or exposed. Ask your healthcare provider to call the local or [facility] health department. Persons who are placed under If you have a medical emergency and need to call 911, notify the dispatch personnel that you have, or are being evaluated for COVID-19. If possible, put on a facemask before emergency medical services arrive. The patient agrees to contact the [facility] exposure line [phone], local health department  and PCP office for questions related to their healthcare.  Author provided contact information for future reference. Patient/family/caregiver given information for [employer] and agrees to enroll yes  Patient's preferred e-mail: [email or web address]   Patient's preferred phone number: [phone]  Based on Loop alert triggers, patient will be contacted by nurse care manager for worsening symptoms.

## 2020-05-19 LAB
EKG ATRIAL RATE: 115 BPM
EKG DIAGNOSIS: NORMAL
EKG P AXIS: 34 DEGREES
EKG P-R INTERVAL: 138 MS
EKG Q-T INTERVAL: 318 MS
EKG QRS DURATION: 76 MS
EKG QTC CALCULATION (BAZETT): 439 MS
EKG R AXIS: 14 DEGREES
EKG T AXIS: 8 DEGREES
EKG VENTRICULAR RATE: 115 BPM

## 2020-05-27 ENCOUNTER — APPOINTMENT (OUTPATIENT)
Dept: GENERAL RADIOLOGY | Age: 45
End: 2020-05-27
Payer: COMMERCIAL

## 2020-05-27 ENCOUNTER — HOSPITAL ENCOUNTER (EMERGENCY)
Age: 45
Discharge: HOME | End: 2020-05-28
Attending: EMERGENCY MEDICINE
Payer: COMMERCIAL

## 2020-05-27 LAB
BASE EXCESS: 4 (ref 0–2.4)
BASOPHILS ABSOLUTE: 0 K/CU MM
BASOPHILS RELATIVE PERCENT: 0.4 % (ref 0–1)
COMMENT: ABNORMAL
DIFFERENTIAL TYPE: ABNORMAL
EOSINOPHILS ABSOLUTE: 0 K/CU MM
EOSINOPHILS RELATIVE PERCENT: 0.4 % (ref 0–3)
HCO3 VENOUS: 18.6 MMOL/L (ref 19–25)
HCT VFR BLD CALC: 37.9 % (ref 37–47)
HEMOGLOBIN: 12.3 GM/DL (ref 12.5–16)
IMMATURE NEUTROPHIL %: 0.4 % (ref 0–0.43)
LYMPHOCYTES ABSOLUTE: 3.4 K/CU MM
LYMPHOCYTES RELATIVE PERCENT: 48.8 % (ref 24–44)
MCH RBC QN AUTO: 30.8 PG (ref 27–31)
MCHC RBC AUTO-ENTMCNC: 32.5 % (ref 32–36)
MCV RBC AUTO: 94.8 FL (ref 78–100)
MONOCYTES ABSOLUTE: 0.5 K/CU MM
MONOCYTES RELATIVE PERCENT: 7.5 % (ref 0–4)
NUCLEATED RBC %: 0 %
O2 SAT, VEN: 67.4 % (ref 50–70)
PCO2, VEN: 28 MMHG (ref 38–52)
PDW BLD-RTO: 12.9 % (ref 11.7–14.9)
PH VENOUS: 7.43 (ref 7.32–7.42)
PLATELET # BLD: 390 K/CU MM (ref 140–440)
PMV BLD AUTO: 10.3 FL (ref 7.5–11.1)
PO2, VEN: 32 MMHG (ref 28–48)
RBC # BLD: 4 M/CU MM (ref 4.2–5.4)
SEGMENTED NEUTROPHILS ABSOLUTE COUNT: 2.9 K/CU MM
SEGMENTED NEUTROPHILS RELATIVE PERCENT: 42.5 % (ref 36–66)
TOTAL IMMATURE NEUTOROPHIL: 0.03 K/CU MM
TOTAL NUCLEATED RBC: 0 K/CU MM
WBC # BLD: 6.9 K/CU MM (ref 4–10.5)

## 2020-05-27 PROCEDURE — 99285 EMERGENCY DEPT VISIT HI MDM: CPT

## 2020-05-27 PROCEDURE — 83880 ASSAY OF NATRIURETIC PEPTIDE: CPT

## 2020-05-27 PROCEDURE — 96375 TX/PRO/DX INJ NEW DRUG ADDON: CPT

## 2020-05-27 PROCEDURE — 80053 COMPREHEN METABOLIC PANEL: CPT

## 2020-05-27 PROCEDURE — 85025 COMPLETE CBC W/AUTO DIFF WBC: CPT

## 2020-05-27 PROCEDURE — 93005 ELECTROCARDIOGRAM TRACING: CPT | Performed by: EMERGENCY MEDICINE

## 2020-05-27 PROCEDURE — 96374 THER/PROPH/DIAG INJ IV PUSH: CPT

## 2020-05-27 PROCEDURE — 6370000000 HC RX 637 (ALT 250 FOR IP): Performed by: EMERGENCY MEDICINE

## 2020-05-27 PROCEDURE — 82805 BLOOD GASES W/O2 SATURATION: CPT

## 2020-05-27 PROCEDURE — 84484 ASSAY OF TROPONIN QUANT: CPT

## 2020-05-27 PROCEDURE — 71045 X-RAY EXAM CHEST 1 VIEW: CPT

## 2020-05-27 PROCEDURE — 2580000003 HC RX 258: Performed by: EMERGENCY MEDICINE

## 2020-05-27 PROCEDURE — 83735 ASSAY OF MAGNESIUM: CPT

## 2020-05-27 PROCEDURE — 6360000002 HC RX W HCPCS: Performed by: EMERGENCY MEDICINE

## 2020-05-27 RX ORDER — MORPHINE SULFATE 4 MG/ML
2 INJECTION, SOLUTION INTRAMUSCULAR; INTRAVENOUS EVERY 30 MIN PRN
Status: DISCONTINUED | OUTPATIENT
Start: 2020-05-27 | End: 2020-05-28 | Stop reason: HOSPADM

## 2020-05-27 RX ORDER — ONDANSETRON 2 MG/ML
4 INJECTION INTRAMUSCULAR; INTRAVENOUS EVERY 30 MIN PRN
Status: DISCONTINUED | OUTPATIENT
Start: 2020-05-27 | End: 2020-05-28 | Stop reason: HOSPADM

## 2020-05-27 RX ORDER — 0.9 % SODIUM CHLORIDE 0.9 %
1000 INTRAVENOUS SOLUTION INTRAVENOUS ONCE
Status: COMPLETED | OUTPATIENT
Start: 2020-05-27 | End: 2020-05-28

## 2020-05-27 RX ORDER — ASPIRIN 81 MG/1
324 TABLET, CHEWABLE ORAL ONCE
Status: COMPLETED | OUTPATIENT
Start: 2020-05-27 | End: 2020-05-27

## 2020-05-27 RX ADMIN — ASPIRIN 81 MG 324 MG: 81 TABLET ORAL at 23:45

## 2020-05-27 RX ADMIN — SODIUM CHLORIDE 1000 ML: 9 INJECTION, SOLUTION INTRAVENOUS at 23:57

## 2020-05-27 RX ADMIN — ONDANSETRON 4 MG: 2 INJECTION INTRAMUSCULAR; INTRAVENOUS at 23:50

## 2020-05-27 RX ADMIN — MORPHINE SULFATE 2 MG: 4 INJECTION INTRAVENOUS at 23:47

## 2020-05-27 ASSESSMENT — PAIN SCALES - GENERAL
PAINLEVEL_OUTOF10: 7
PAINLEVEL_OUTOF10: 7

## 2020-05-27 ASSESSMENT — PAIN DESCRIPTION - LOCATION: LOCATION: CHEST

## 2020-05-28 ENCOUNTER — APPOINTMENT (OUTPATIENT)
Dept: CT IMAGING | Age: 45
End: 2020-05-28
Payer: COMMERCIAL

## 2020-05-28 VITALS
HEIGHT: 66 IN | WEIGHT: 255 LBS | RESPIRATION RATE: 24 BRPM | BODY MASS INDEX: 40.98 KG/M2 | HEART RATE: 90 BPM | TEMPERATURE: 98.4 F | SYSTOLIC BLOOD PRESSURE: 131 MMHG | OXYGEN SATURATION: 100 % | DIASTOLIC BLOOD PRESSURE: 78 MMHG

## 2020-05-28 LAB
ALBUMIN SERPL-MCNC: 3.9 GM/DL (ref 3.4–5)
ALP BLD-CCNC: 64 IU/L (ref 40–129)
ALT SERPL-CCNC: 13 U/L (ref 10–40)
ANION GAP SERPL CALCULATED.3IONS-SCNC: 16 MMOL/L (ref 4–16)
AST SERPL-CCNC: 18 IU/L (ref 15–37)
BACTERIA: NEGATIVE /HPF
BILIRUB SERPL-MCNC: 0.2 MG/DL (ref 0–1)
BILIRUBIN URINE: NEGATIVE MG/DL
BLOOD, URINE: NEGATIVE
BUN BLDV-MCNC: 7 MG/DL (ref 6–23)
CALCIUM SERPL-MCNC: 9.3 MG/DL (ref 8.3–10.6)
CHLORIDE BLD-SCNC: 102 MMOL/L (ref 99–110)
CLARITY: CLEAR
CO2: 18 MMOL/L (ref 21–32)
COLOR: YELLOW
CREAT SERPL-MCNC: 0.7 MG/DL (ref 0.6–1.1)
GFR AFRICAN AMERICAN: >60 ML/MIN/1.73M2
GFR NON-AFRICAN AMERICAN: >60 ML/MIN/1.73M2
GLUCOSE BLD-MCNC: 112 MG/DL (ref 70–99)
GLUCOSE, URINE: NEGATIVE MG/DL
KETONES, URINE: ABNORMAL MG/DL
LEUKOCYTE ESTERASE, URINE: NEGATIVE
MAGNESIUM: 1.8 MG/DL (ref 1.8–2.4)
MUCUS: ABNORMAL HPF
NITRITE URINE, QUANTITATIVE: NEGATIVE
PH, URINE: 5 (ref 5–8)
POTASSIUM SERPL-SCNC: 3.5 MMOL/L (ref 3.5–5.1)
PRO-BNP: 8.65 PG/ML
PROTEIN UA: NEGATIVE MG/DL
RBC URINE: ABNORMAL /HPF (ref 0–6)
SODIUM BLD-SCNC: 136 MMOL/L (ref 135–145)
SPECIFIC GRAVITY UA: 1.04 (ref 1–1.03)
SQUAMOUS EPITHELIAL: <1 /HPF
TOTAL PROTEIN: 7.8 GM/DL (ref 6.4–8.2)
TRICHOMONAS: ABNORMAL /HPF
TROPONIN T: <0.01 NG/ML
UROBILINOGEN, URINE: NORMAL MG/DL (ref 0.2–1)
WBC UA: 1 /HPF (ref 0–5)

## 2020-05-28 PROCEDURE — 81001 URINALYSIS AUTO W/SCOPE: CPT

## 2020-05-28 PROCEDURE — 6360000004 HC RX CONTRAST MEDICATION: Performed by: EMERGENCY MEDICINE

## 2020-05-28 PROCEDURE — 71275 CT ANGIOGRAPHY CHEST: CPT

## 2020-05-28 PROCEDURE — 93010 ELECTROCARDIOGRAM REPORT: CPT | Performed by: INTERNAL MEDICINE

## 2020-05-28 RX ORDER — NAPROXEN 500 MG/1
500 TABLET ORAL 2 TIMES DAILY PRN
Qty: 20 TABLET | Refills: 0 | Status: SHIPPED | OUTPATIENT
Start: 2020-05-28 | End: 2021-05-06

## 2020-05-28 RX ORDER — HYDROCODONE BITARTRATE AND ACETAMINOPHEN 5; 325 MG/1; MG/1
1 TABLET ORAL EVERY 6 HOURS PRN
Qty: 12 TABLET | Refills: 0 | Status: SHIPPED | OUTPATIENT
Start: 2020-05-28 | End: 2020-05-31

## 2020-05-28 RX ORDER — SODIUM CHLORIDE 0.9 % (FLUSH) 0.9 %
10 SYRINGE (ML) INJECTION 2 TIMES DAILY
Status: DISCONTINUED | OUTPATIENT
Start: 2020-05-28 | End: 2020-05-28 | Stop reason: HOSPADM

## 2020-05-28 RX ORDER — ASPIRIN 81 MG/1
324 TABLET, CHEWABLE ORAL ONCE
Status: DISCONTINUED | OUTPATIENT
Start: 2020-05-28 | End: 2020-05-28

## 2020-05-28 RX ORDER — MORPHINE SULFATE 4 MG/ML
4 INJECTION, SOLUTION INTRAMUSCULAR; INTRAVENOUS EVERY 4 HOURS PRN
Status: DISCONTINUED | OUTPATIENT
Start: 2020-05-28 | End: 2020-05-28 | Stop reason: HOSPADM

## 2020-05-28 RX ADMIN — IOPAMIDOL 100 ML: 755 INJECTION, SOLUTION INTRAVENOUS at 00:51

## 2020-05-28 ASSESSMENT — ENCOUNTER SYMPTOMS
PHOTOPHOBIA: 0
STRIDOR: 0
RECTAL PAIN: 0
BACK PAIN: 0
EYE ITCHING: 0
COUGH: 1
CONSTIPATION: 0
EYE REDNESS: 0
CHEST TIGHTNESS: 0
NAUSEA: 0
TROUBLE SWALLOWING: 0
EYE DISCHARGE: 0
APNEA: 0
DIARRHEA: 0
ABDOMINAL PAIN: 0
VOICE CHANGE: 0
CHOKING: 0
VOMITING: 0
WHEEZING: 0
RHINORRHEA: 0
EYE PAIN: 0
GASTROINTESTINAL NEGATIVE: 1
ORTHOPNEA: 0
SINUS PRESSURE: 0
SHORTNESS OF BREATH: 1
SINUS PAIN: 0
EYES NEGATIVE: 1
FACIAL SWELLING: 0
BLOOD IN STOOL: 0

## 2020-05-28 ASSESSMENT — PAIN DESCRIPTION - PROGRESSION: CLINICAL_PROGRESSION: GRADUALLY IMPROVING

## 2020-05-28 ASSESSMENT — PAIN SCALES - GENERAL: PAINLEVEL_OUTOF10: 4

## 2020-05-28 NOTE — ED NOTES
Report received from [facility] to assume care of pt      John Sarah RN  05/27/20 5601

## 2020-05-28 NOTE — ED PROVIDER NOTES
redness, itching and visual disturbance. Respiratory: Positive for cough and shortness of breath. Negative for apnea, choking, chest tightness, wheezing and stridor. Cardiovascular: Positive for chest pain. Negative for palpitations, orthopnea, leg swelling, syncope, PND and near-syncope. Gastrointestinal: Negative. Negative for abdominal pain, anorexia, blood in stool, constipation, diarrhea, nausea, rectal pain and vomiting. Endocrine: Negative for polyuria. Genitourinary: Negative. Negative for dysuria, flank pain, frequency, hematuria and urgency. Musculoskeletal: Negative. Negative for arthralgias, back pain, gait problem, myalgias, neck pain and neck stiffness. Skin: Negative. Negative for rash. Neurological: Negative. Negative for dizziness, speech difficulty, weakness, light-headedness, numbness and headaches. Psychiatric/Behavioral: Negative. Negative for agitation, confusion, self-injury, sleep disturbance and suicidal ideas. The patient is not nervous/anxious. All other systems reviewed and are negative. Except as noted above the remainder of the review of systems was reviewed and negative. PAST MEDICAL HISTORY     Past Medical History:   Diagnosis Date    Helicobacter pylori (H. pylori) 8/27/12    Migraines     Reflux        Prior to Admission medications    Medication Sig Start Date End Date Taking? Authorizing Provider   naproxen (NAPROSYN) 500 MG tablet Take 1 tablet by mouth 2 times daily as needed for Pain 5/28/20 6/7/20 Yes Judith Nory, DO   HYDROcodone-acetaminophen (NORCO) 5-325 MG per tablet Take 1 tablet by mouth every 6 hours as needed for Pain for up to 3 days. Intended supply: 3 days.  Take lowest dose possible to manage pain 5/28/20 5/31/20 Yes Judith Lakeside, DO   albuterol sulfate HFA (PROVENTIL HFA) 108 (90 Base) MCG/ACT inhaler Inhale 2 puffs into the lungs every 6 hours as needed for Wheezing or Shortness of Breath 5/16/20   Edwige Mendoza BRENNAN   ondansetron (ZOFRAN ODT) 4 MG disintegrating tablet Take 1 tablet by mouth every 8 hours as needed for Nausea 3/2/20   LAURIE Olson   naproxen (NAPROSYN) 500 MG tablet Take 1 tablet by mouth 2 times daily as needed for Pain 2/11/20 2/21/20  Penny Meier PA-C   SUMAtriptan (IMITREX) 25 MG tablet  8/31/19   Historical Provider, MD   Emollient [facility]) OINT ointment  8/31/19   Historical Provider, MD   fluticasone [facility]) 50 MCG/ACT nasal spray  8/31/19   Historical Provider, MD   traZODone (DESYREL) 50 MG tablet  8/31/19   Historical Provider, MD   fexofenadine (ALLEGRA ALLERGY) 180 MG tablet Take 180 mg by mouth daily    Historical Provider, MD   ranitidine (ZANTAC) 150 MG tablet Take 150 mg by mouth 2 times daily    Historical Provider, MD        Patient Active Problem List   Diagnosis    HUMPHREY (obstructive sleep apnea)    Excessive daytime sleepiness    Obesity (BMI 30-39. 9)         SURGICAL HISTORY       Past Surgical History:   Procedure Laterality Date    ABDOMEN SURGERY      TUBAL LIGATION           CURRENT MEDICATIONS       Previous Medications    ALBUTEROL SULFATE HFA (PROVENTIL HFA) 108 (90 BASE) MCG/ACT INHALER    Inhale 2 puffs into the lungs every 6 hours as needed for Wheezing or Shortness of Breath    EMOLLIENT (DERMAPHOR) OINT OINTMENT        FEXOFENADINE (ALLEGRA ALLERGY) 180 MG TABLET    Take 180 mg by mouth daily    FLUTICASONE (FLONASE) 50 MCG/ACT NASAL SPRAY        NAPROXEN (NAPROSYN) 500 MG TABLET    Take 1 tablet by mouth 2 times daily as needed for Pain    ONDANSETRON (ZOFRAN ODT) 4 MG DISINTEGRATING TABLET    Take 1 tablet by mouth every 8 hours as needed for Nausea    RANITIDINE (ZANTAC) 150 MG TABLET    Take 150 mg by mouth 2 times daily    SUMATRIPTAN (IMITREX) 25 MG TABLET        TRAZODONE (DESYREL) 50 MG TABLET           ALLERGIES     Patient has no known allergies.     FAMILY HISTORY       Family History   Problem Relation Age of Onset    Depression Mother    [facility] Stroke Father     Alcohol Abuse Maternal Uncle     Cancer Maternal Uncle         throat    Diabetes Maternal Grandmother     High Blood Pressure Maternal Grandmother     Mental Retardation Maternal Cousin     Allergy (Severe) Other     Learning Disabilities Other         son          SOCIAL HISTORY       Social History     Socioeconomic History    Marital status:      Spouse name: None    Number of children: None    Years of education: None    Highest education level: None   Occupational History    None   Social Needs    Financial resource strain: None    Food insecurity     Worry: None     Inability: None    Transportation needs     Medical: None     Non-medical: None   Tobacco Use    Smoking status: Passive Smoke Exposure - Never Smoker    Smokeless tobacco: Never Used    Tobacco comment:  smokes in house    Substance and Sexual Activity    Alcohol use: No    Drug use: No    Sexual activity: Yes     Partners: Male   Lifestyle    Physical activity     Days per week: None     Minutes per session: None    Stress: None   Relationships    Social connections     Talks on phone: None     Gets together: None     Attends Yazidi service: None     Active member of club or organization: None     Attends meetings of clubs or organizations: None     Relationship status: None    Intimate partner violence     Fear of current or ex partner: None     Emotionally abused: None     Physically abused: None     Forced sexual activity: None   Other Topics Concern    None   Social History Narrative    None       SCREENINGS    Ivana Coma Scale  Eye Opening: Spontaneous  Best Verbal Response: Oriented  Best Motor Response: Obeys commands  Ivana Coma Scale Score: 15          PHYSICAL EXAM    (up to 7 for level 4, 8 or more for level 5)     ED Triage Vitals   BP Temp Temp Source Pulse Resp SpO2 Height Weight   05/27/20 2305 05/27/20 2305 05/27/20 2305 05/27/20 2315 05/27/20 2305 05/27/20 2315 05/27/20 2247 05/27/20 2247   130/85 98.4 °F (36.9 °C) Oral 94 18 100 % 5' 6\" (1.676 m) 255 lb (115.7 kg)       Physical Exam  Vitals signs and nursing note reviewed. Constitutional:       General: She is not in acute distress. Appearance: She is well-developed. She is not diaphoretic. HENT:      Head: Normocephalic and atraumatic. Right Ear: External ear normal.      Left Ear: External ear normal.      Nose: Nose normal.      Mouth/Throat:      Pharynx: No oropharyngeal exudate. Eyes:      General: No scleral icterus. Right eye: No discharge. Left eye: No discharge. Pupils: Pupils are equal, round, and reactive to light. Neck:      Musculoskeletal: Normal range of motion. Thyroid: No thyromegaly. Vascular: No JVD. Trachea: No tracheal deviation. Cardiovascular:      Rate and Rhythm: Normal rate and regular rhythm. Heart sounds: Normal heart sounds. No murmur. No friction rub. No gallop. Pulmonary:      Effort: Pulmonary effort is normal. No respiratory distress. Breath sounds: Normal breath sounds. No stridor. No wheezing or rales. Chest:      Chest wall: No tenderness. Abdominal:      General: Bowel sounds are normal. There is no distension. Palpations: Abdomen is soft. There is no mass. Tenderness: There is no abdominal tenderness. There is no guarding or rebound. Musculoskeletal: Normal range of motion. General: No tenderness or deformity. Lymphadenopathy:      Cervical: No cervical adenopathy. Skin:     General: Skin is warm. Capillary Refill: Capillary refill takes less than 2 seconds. Coloration: Skin is not pale. Findings: No erythema or rash. Neurological:      Mental Status: She is alert and oriented to person, place, and time. Cranial Nerves: No cranial nerve deficit. Sensory: No sensory deficit. Motor: No abnormal muscle tone.       Coordination: Coordination normal.      Deep Tendon Reflexes: Reflexes normal.   Psychiatric:         Behavior: Behavior normal.         Thought Content: Thought content normal.         Judgment: Judgment normal.         DIAGNOSTIC RESULTS     Labs Reviewed   CBC WITH AUTO DIFFERENTIAL - Abnormal; Notable for the following components:       Result Value    RBC 4.00 (*)     Hemoglobin 12.3 (*)     Lymphocytes % 48.8 (*)     Monocytes % 7.5 (*)     All other components within normal limits   COMPREHENSIVE METABOLIC PANEL W/ REFLEX TO MG FOR LOW K - Abnormal; Notable for the following components:    CO2 18 (*)     Glucose 112 (*)     All other components within normal limits   BLOOD GAS, VENOUS - Abnormal; Notable for the following components:    pH, Korey 7.43 (*)     pCO2, Korey 28 (*)     Base Excess 4 (*)     HCO3, Venous 18.6 (*)     All other components within normal limits   TROPONIN   BRAIN NATRIURETIC PEPTIDE   MAGNESIUM   URINALYSIS          EKG: All EKG's are interpreted by the Emergency Department Physician who either signs or Co-signs this chart in the absence of a cardiologist.       EKG Interpretation    Interpreted by emergency department physician    Rhythm: normal sinus   Rate: normal  Axis: normal  Ectopy: none  Conduction: normal  ST Segments: no acute change  T Waves: no acute change  Q Waves: none    Clinical Impression: no acute changes    Sindy Hopper     RADIOLOGY:     Non-plain film images such as CT, Ultrasound and MRI are read by the radiologist. Plain radiographic images are visualized and preliminarily interpreted by the emergency physician. Interpretation per the Radiologist below, if available at the time of this note:    CTA PULMONARY W CONTRAST   Final Result   No evidence of an acute pulmonary embolus. Multifocal ground-glass opacities in both lungs, mildly to moderately   improved from 05/15/2020. An improving viral/Covid-19 pneumonia is suspected.       Commonly reported imaging features of COVID-19 pneumonia are present. Other   processes such as influenza pneumonia and organizing pneumonia, as can be   seen with drug toxicity and connective tissue disease, can cause a similar   imaging pattern. PneTyp         XR CHEST PORTABLE   Final Result   No acute process. ED BEDSIDE ULTRASOUND:   Performed by ED Physician Irvin Garcia DO       LABS:  Labs Reviewed   CBC WITH AUTO DIFFERENTIAL - Abnormal; Notable for the following components:       Result Value    RBC 4.00 (*)     Hemoglobin 12.3 (*)     Lymphocytes % 48.8 (*)     Monocytes % 7.5 (*)     All other components within normal limits   COMPREHENSIVE METABOLIC PANEL W/ REFLEX TO MG FOR LOW K - Abnormal; Notable for the following components:    CO2 18 (*)     Glucose 112 (*)     All other components within normal limits   BLOOD GAS, VENOUS - Abnormal; Notable for the following components:    pH, Korey 7.43 (*)     pCO2, Korey 28 (*)     Base Excess 4 (*)     HCO3, Venous 18.6 (*)     All other components within normal limits   TROPONIN   BRAIN NATRIURETIC PEPTIDE   MAGNESIUM   URINALYSIS       All other labs were within normal range or not returned as of this dictation. EMERGENCY DEPARTMENT COURSE and DIFFERENTIAL DIAGNOSIS/MDM:   Vitals:    Vitals:    05/28/20 0017 05/28/20 0031 05/28/20 0049 05/28/20 0117   BP: 137/88 134/85 (!) 149/87 (!) 146/88   Pulse: 85 85 93 89   Resp: 30 24 27 26   Temp:       TempSrc:       SpO2: 100% 100% 100% 100%   Weight:       Height:               MDM  Number of Diagnoses or Management Options  Diagnosis management comments: 69-year-old female presents emergency department with chief complaint of chest pain that is worse with breathing. Patient was recently diagnosed with COVID. This occurred approximately 10 days ago. CT of the chest at the time showed multiple minor groundglass opacities. Vital signs were normal at that time except for reported fever.   In the emergency department today, vital signs are normal and

## 2020-05-29 ENCOUNTER — TELEPHONE (OUTPATIENT)
Dept: OTHER | Facility: CLINIC | Age: 45
End: 2020-05-29

## 2020-05-29 LAB
EKG ATRIAL RATE: 95 BPM
EKG DIAGNOSIS: NORMAL
EKG P AXIS: 39 DEGREES
EKG P-R INTERVAL: 140 MS
EKG Q-T INTERVAL: 352 MS
EKG QRS DURATION: 76 MS
EKG QTC CALCULATION (BAZETT): 442 MS
EKG R AXIS: 17 DEGREES
EKG T AXIS: -5 DEGREES
EKG VENTRICULAR RATE: 95 BPM

## 2020-06-01 ENCOUNTER — APPOINTMENT (OUTPATIENT)
Dept: ULTRASOUND IMAGING | Age: 45
End: 2020-06-01
Payer: COMMERCIAL

## 2020-06-01 ENCOUNTER — HOSPITAL ENCOUNTER (EMERGENCY)
Age: 45
Discharge: HOME | End: 2020-06-02
Payer: COMMERCIAL

## 2020-06-01 ENCOUNTER — APPOINTMENT (OUTPATIENT)
Dept: GENERAL RADIOLOGY | Age: 45
End: 2020-06-01
Payer: COMMERCIAL

## 2020-06-01 VITALS
RESPIRATION RATE: 18 BRPM | HEIGHT: 67 IN | WEIGHT: 255 LBS | BODY MASS INDEX: 40.02 KG/M2 | HEART RATE: 90 BPM | OXYGEN SATURATION: 100 % | DIASTOLIC BLOOD PRESSURE: 72 MMHG | SYSTOLIC BLOOD PRESSURE: 114 MMHG | TEMPERATURE: 98.1 F

## 2020-06-01 LAB
ALBUMIN SERPL-MCNC: 3.8 GM/DL (ref 3.4–5)
ALP BLD-CCNC: 55 IU/L (ref 40–128)
ALT SERPL-CCNC: 10 U/L (ref 10–40)
ANION GAP SERPL CALCULATED.3IONS-SCNC: 12 MMOL/L (ref 4–16)
AST SERPL-CCNC: 15 IU/L (ref 15–37)
BASOPHILS ABSOLUTE: 0 K/CU MM
BASOPHILS RELATIVE PERCENT: 0.6 % (ref 0–1)
BILIRUB SERPL-MCNC: 0.2 MG/DL (ref 0–1)
BUN BLDV-MCNC: 8 MG/DL (ref 6–23)
CALCIUM SERPL-MCNC: 9.3 MG/DL (ref 8.3–10.6)
CHLORIDE BLD-SCNC: 103 MMOL/L (ref 99–110)
CO2: 20 MMOL/L (ref 21–32)
CREAT SERPL-MCNC: 0.7 MG/DL (ref 0.6–1.1)
DIFFERENTIAL TYPE: ABNORMAL
EOSINOPHILS ABSOLUTE: 0 K/CU MM
EOSINOPHILS RELATIVE PERCENT: 0.5 % (ref 0–3)
GFR AFRICAN AMERICAN: >60 ML/MIN/1.73M2
GFR NON-AFRICAN AMERICAN: >60 ML/MIN/1.73M2
GLUCOSE BLD-MCNC: 85 MG/DL (ref 70–99)
HCG QUALITATIVE: NEGATIVE
HCT VFR BLD CALC: 38.7 % (ref 37–47)
HEMOGLOBIN: 12.5 GM/DL (ref 12.5–16)
IMMATURE NEUTROPHIL %: 0.2 % (ref 0–0.43)
LIPASE: 16 IU/L (ref 13–60)
LYMPHOCYTES ABSOLUTE: 2.8 K/CU MM
LYMPHOCYTES RELATIVE PERCENT: 45.1 % (ref 24–44)
MCH RBC QN AUTO: 31.3 PG (ref 27–31)
MCHC RBC AUTO-ENTMCNC: 32.3 % (ref 32–36)
MCV RBC AUTO: 96.8 FL (ref 78–100)
MONOCYTES ABSOLUTE: 0.3 K/CU MM
MONOCYTES RELATIVE PERCENT: 5 % (ref 0–4)
NUCLEATED RBC %: 0 %
PDW BLD-RTO: 13.4 % (ref 11.7–14.9)
PLATELET # BLD: 348 K/CU MM (ref 140–440)
PMV BLD AUTO: 10.5 FL (ref 7.5–11.1)
POTASSIUM SERPL-SCNC: 3.7 MMOL/L (ref 3.5–5.1)
RBC # BLD: 4 M/CU MM (ref 4.2–5.4)
REASON FOR REJECTION: NORMAL
REJECTED TEST: NORMAL
SEGMENTED NEUTROPHILS ABSOLUTE COUNT: 3 K/CU MM
SEGMENTED NEUTROPHILS RELATIVE PERCENT: 48.6 % (ref 36–66)
SODIUM BLD-SCNC: 135 MMOL/L (ref 135–145)
TOTAL IMMATURE NEUTOROPHIL: 0.01 K/CU MM
TOTAL NUCLEATED RBC: 0 K/CU MM
TOTAL PROTEIN: 7.2 GM/DL (ref 6.4–8.2)
TROPONIN T: <0.01 NG/ML
TROPONIN T: <0.01 NG/ML
WBC # BLD: 6.3 K/CU MM (ref 4–10.5)

## 2020-06-01 PROCEDURE — 93005 ELECTROCARDIOGRAM TRACING: CPT | Performed by: PHYSICIAN ASSISTANT

## 2020-06-01 PROCEDURE — 80053 COMPREHEN METABOLIC PANEL: CPT

## 2020-06-01 PROCEDURE — 99285 EMERGENCY DEPT VISIT HI MDM: CPT

## 2020-06-01 PROCEDURE — 84484 ASSAY OF TROPONIN QUANT: CPT

## 2020-06-01 PROCEDURE — 85025 COMPLETE CBC W/AUTO DIFF WBC: CPT

## 2020-06-01 PROCEDURE — 83690 ASSAY OF LIPASE: CPT

## 2020-06-01 PROCEDURE — 84703 CHORIONIC GONADOTROPIN ASSAY: CPT

## 2020-06-01 PROCEDURE — 76705 ECHO EXAM OF ABDOMEN: CPT

## 2020-06-01 PROCEDURE — 71045 X-RAY EXAM CHEST 1 VIEW: CPT

## 2020-06-01 NOTE — TELEPHONE ENCOUNTER
Patient contacted regarding recent visit for viral symptoms. This Jo Ann Rowland contacted the patient by telephone to perform post discharge call. Verified name and  with patient as identifiers. Provided introduction to self, and reason for call due to viral symptoms of infection and/or exposure to COVID-19. Patient presented to emergency department/flu clinic with complaints of viral symptoms/exposure to COVID. Patient reports symptoms are improving. Due to no new or worsening symptoms the RN CTN/ACM was not notified for escalation. Discussed exposure protocols and quarantine with CDC Guidelines What To Do If You Are Sick    Patient was given an opportunity for questions and concerns. Stay home except to get medical care    Separate yourself from other people and animals in your home    Call ahead before visiting your doctor    Wear a facemask    Cover your coughs and sneezes    Clean your hands often    Avoid sharing personal household items    Clean all high-touch surfaces everyday    Monitor your symptoms  Seek prompt medical attention if your illness is worsening (e.g., difficulty breathing). Before seeking care, call your healthcare provider and tell them that you have, or are being evaluated for, COVID-19. Put on a facemask before you enter the facility. These steps will help the healthcare provider's office to keep other people in the office or waiting room from getting infected or exposed. Ask your healthcare provider to call the local or [facility] health department. Persons who are placed under If you have a medical emergency and need to call 911, notify the dispatch personnel that you have, or are being evaluated for COVID-19. If possible, put on a facemask before emergency medical services arrive. The patient agrees to contact the [facility] exposure line [phone], local health department  and PCP office for questions related to their healthcare.  Author provided contact information for future

## 2020-06-03 PROCEDURE — 93010 ELECTROCARDIOGRAM REPORT: CPT | Performed by: INTERNAL MEDICINE

## 2020-06-09 LAB
EKG ATRIAL RATE: 99 BPM
EKG DIAGNOSIS: NORMAL
EKG P AXIS: 27 DEGREES
EKG P-R INTERVAL: 146 MS
EKG Q-T INTERVAL: 350 MS
EKG QRS DURATION: 74 MS
EKG QTC CALCULATION (BAZETT): 449 MS
EKG R AXIS: 13 DEGREES
EKG T AXIS: 8 DEGREES
EKG VENTRICULAR RATE: 99 BPM

## 2020-10-01 ENCOUNTER — HOSPITAL ENCOUNTER (OUTPATIENT)
Dept: WOMENS IMAGING | Age: 45
Discharge: HOME | End: 2020-10-01
Payer: COMMERCIAL

## 2020-10-01 PROCEDURE — 77067 SCR MAMMO BI INCL CAD: CPT

## 2020-12-02 ENCOUNTER — HOSPITAL ENCOUNTER (EMERGENCY)
Age: 45
Discharge: HOME | End: 2020-12-03
Attending: EMERGENCY MEDICINE
Payer: COMMERCIAL

## 2020-12-02 ENCOUNTER — APPOINTMENT (OUTPATIENT)
Dept: CT IMAGING | Age: 45
End: 2020-12-02
Payer: COMMERCIAL

## 2020-12-02 LAB
ALBUMIN SERPL-MCNC: 3.7 GM/DL (ref 3.4–5)
ALP BLD-CCNC: 77 IU/L (ref 40–129)
ALT SERPL-CCNC: 9 U/L (ref 10–40)
ANION GAP SERPL CALCULATED.3IONS-SCNC: 12 MMOL/L (ref 4–16)
AST SERPL-CCNC: 16 IU/L (ref 15–37)
BACTERIA: NEGATIVE /HPF
BASOPHILS ABSOLUTE: 0 K/CU MM
BASOPHILS RELATIVE PERCENT: 0.4 % (ref 0–1)
BILIRUB SERPL-MCNC: 0 MG/DL (ref 0–1)
BILIRUBIN URINE: ABNORMAL MG/DL
BLOOD, URINE: ABNORMAL
BUN BLDV-MCNC: 7 MG/DL (ref 6–23)
CALCIUM SERPL-MCNC: 9.2 MG/DL (ref 8.3–10.6)
CHLORIDE BLD-SCNC: 99 MMOL/L (ref 99–110)
CLARITY: CLEAR
CO2: 22 MMOL/L (ref 21–32)
COLOR: YELLOW
CREAT SERPL-MCNC: 0.7 MG/DL (ref 0.6–1.1)
DIFFERENTIAL TYPE: NORMAL
EOSINOPHILS ABSOLUTE: 0.1 K/CU MM
EOSINOPHILS RELATIVE PERCENT: 1.2 % (ref 0–3)
GFR AFRICAN AMERICAN: >60 ML/MIN/1.73M2
GFR NON-AFRICAN AMERICAN: >60 ML/MIN/1.73M2
GLUCOSE BLD-MCNC: 106 MG/DL (ref 70–99)
GLUCOSE, URINE: NEGATIVE MG/DL
HCT VFR BLD CALC: 40.6 % (ref 37–47)
HEMOGLOBIN: 13 GM/DL (ref 12.5–16)
ICTOTEST: NEGATIVE
IMMATURE NEUTROPHIL %: 0.1 % (ref 0–0.43)
INTERPRETATION: NORMAL
KETONES, URINE: NEGATIVE MG/DL
LEUKOCYTE ESTERASE, URINE: NEGATIVE
LIPASE: 18 IU/L (ref 13–60)
LYMPHOCYTES ABSOLUTE: 2.8 K/CU MM
LYMPHOCYTES RELATIVE PERCENT: 40.8 % (ref 24–44)
MCH RBC QN AUTO: 30.4 PG (ref 27–31)
MCHC RBC AUTO-ENTMCNC: 32 % (ref 32–36)
MCV RBC AUTO: 95.1 FL (ref 78–100)
MONOCYTES ABSOLUTE: 0.3 K/CU MM
MONOCYTES RELATIVE PERCENT: 3.8 % (ref 0–4)
MUCUS: ABNORMAL HPF
NITRITE URINE, QUANTITATIVE: NEGATIVE
NUCLEATED RBC %: 0 %
PDW BLD-RTO: 13 % (ref 11.7–14.9)
PH, URINE: 5 (ref 5–8)
PLATELET # BLD: 316 K/CU MM (ref 140–440)
PMV BLD AUTO: 10.7 FL (ref 7.5–11.1)
POTASSIUM SERPL-SCNC: 4 MMOL/L (ref 3.5–5.1)
PREGNANCY, URINE: NEGATIVE
PROTEIN UA: NEGATIVE MG/DL
RBC # BLD: 4.27 M/CU MM (ref 4.2–5.4)
RBC URINE: 1 /HPF (ref 0–6)
SEGMENTED NEUTROPHILS ABSOLUTE COUNT: 3.7 K/CU MM
SEGMENTED NEUTROPHILS RELATIVE PERCENT: 53.7 % (ref 36–66)
SODIUM BLD-SCNC: 133 MMOL/L (ref 135–145)
SPECIFIC GRAVITY UA: 1.02 (ref 1–1.03)
SPECIFIC GRAVITY, URINE: 1.02 (ref 1–1.03)
SQUAMOUS EPITHELIAL: 1 /HPF
TOTAL IMMATURE NEUTOROPHIL: 0.01 K/CU MM
TOTAL NUCLEATED RBC: 0 K/CU MM
TOTAL PROTEIN: 7.5 GM/DL (ref 6.4–8.2)
TRICHOMONAS: ABNORMAL /HPF
UROBILINOGEN, URINE: NORMAL MG/DL (ref 0.2–1)
WBC # BLD: 6.9 K/CU MM (ref 4–10.5)
WBC UA: 1 /HPF (ref 0–5)

## 2020-12-02 PROCEDURE — 99284 EMERGENCY DEPT VISIT MOD MDM: CPT

## 2020-12-02 PROCEDURE — 81025 URINE PREGNANCY TEST: CPT

## 2020-12-02 PROCEDURE — 80307 DRUG TEST PRSMV CHEM ANLYZR: CPT

## 2020-12-02 PROCEDURE — 96375 TX/PRO/DX INJ NEW DRUG ADDON: CPT

## 2020-12-02 PROCEDURE — 81001 URINALYSIS AUTO W/SCOPE: CPT

## 2020-12-02 PROCEDURE — 80053 COMPREHEN METABOLIC PANEL: CPT

## 2020-12-02 PROCEDURE — 87086 URINE CULTURE/COLONY COUNT: CPT

## 2020-12-02 PROCEDURE — 6360000004 HC RX CONTRAST MEDICATION: Performed by: EMERGENCY MEDICINE

## 2020-12-02 PROCEDURE — 96374 THER/PROPH/DIAG INJ IV PUSH: CPT

## 2020-12-02 PROCEDURE — 74177 CT ABD & PELVIS W/CONTRAST: CPT

## 2020-12-02 PROCEDURE — 96361 HYDRATE IV INFUSION ADD-ON: CPT

## 2020-12-02 PROCEDURE — 6360000002 HC RX W HCPCS: Performed by: EMERGENCY MEDICINE

## 2020-12-02 PROCEDURE — 2580000003 HC RX 258: Performed by: EMERGENCY MEDICINE

## 2020-12-02 PROCEDURE — 85025 COMPLETE CBC W/AUTO DIFF WBC: CPT

## 2020-12-02 PROCEDURE — 83690 ASSAY OF LIPASE: CPT

## 2020-12-02 PROCEDURE — 36415 COLL VENOUS BLD VENIPUNCTURE: CPT

## 2020-12-02 RX ORDER — 0.9 % SODIUM CHLORIDE 0.9 %
1000 INTRAVENOUS SOLUTION INTRAVENOUS ONCE
Status: COMPLETED | OUTPATIENT
Start: 2020-12-02 | End: 2020-12-03

## 2020-12-02 RX ORDER — SODIUM CHLORIDE 0.9 % (FLUSH) 0.9 %
10 SYRINGE (ML) INJECTION 2 TIMES DAILY
Status: DISCONTINUED | OUTPATIENT
Start: 2020-12-02 | End: 2020-12-03 | Stop reason: HOSPADM

## 2020-12-02 RX ORDER — ONDANSETRON 2 MG/ML
4 INJECTION INTRAMUSCULAR; INTRAVENOUS ONCE
Status: COMPLETED | OUTPATIENT
Start: 2020-12-02 | End: 2020-12-02

## 2020-12-02 RX ORDER — FENTANYL CITRATE 50 UG/ML
50 INJECTION, SOLUTION INTRAMUSCULAR; INTRAVENOUS ONCE
Status: COMPLETED | OUTPATIENT
Start: 2020-12-02 | End: 2020-12-02

## 2020-12-02 RX ADMIN — IOPAMIDOL 80 ML: 755 INJECTION, SOLUTION INTRAVENOUS at 23:37

## 2020-12-02 RX ADMIN — ONDANSETRON 4 MG: 2 INJECTION INTRAMUSCULAR; INTRAVENOUS at 23:43

## 2020-12-02 RX ADMIN — SODIUM CHLORIDE 1000 ML: 9 INJECTION, SOLUTION INTRAVENOUS at 23:03

## 2020-12-02 RX ADMIN — FENTANYL CITRATE 50 MCG: 50 INJECTION, SOLUTION INTRAMUSCULAR; INTRAVENOUS at 23:43

## 2020-12-02 ASSESSMENT — PAIN SCALES - GENERAL
PAINLEVEL_OUTOF10: 7
PAINLEVEL_OUTOF10: 7

## 2020-12-02 ASSESSMENT — PAIN DESCRIPTION - FREQUENCY: FREQUENCY: CONTINUOUS

## 2020-12-02 ASSESSMENT — PAIN DESCRIPTION - ORIENTATION: ORIENTATION: RIGHT

## 2020-12-02 ASSESSMENT — PAIN DESCRIPTION - LOCATION: LOCATION: FLANK;BACK

## 2020-12-02 ASSESSMENT — PAIN DESCRIPTION - DESCRIPTORS: DESCRIPTORS: SPASM

## 2020-12-03 VITALS
SYSTOLIC BLOOD PRESSURE: 114 MMHG | OXYGEN SATURATION: 98 % | RESPIRATION RATE: 18 BRPM | BODY MASS INDEX: 41.78 KG/M2 | DIASTOLIC BLOOD PRESSURE: 63 MMHG | HEART RATE: 95 BPM | TEMPERATURE: 97.9 F | WEIGHT: 260 LBS | HEIGHT: 66 IN

## 2020-12-03 LAB
AMPHETAMINES: NEGATIVE
BARBITURATE SCREEN URINE: NEGATIVE
BENZODIAZEPINE SCREEN, URINE: NEGATIVE
CANNABINOID SCREEN URINE: NEGATIVE
COCAINE METABOLITE: NEGATIVE
OPIATES, URINE: NEGATIVE
OXYCODONE: NEGATIVE
PHENCYCLIDINE, URINE: NEGATIVE

## 2020-12-03 RX ORDER — DICYCLOMINE HCL 20 MG
20 TABLET ORAL 3 TIMES DAILY PRN
Qty: 21 TABLET | Refills: 3 | Status: SHIPPED | OUTPATIENT
Start: 2020-12-03

## 2020-12-03 ASSESSMENT — PAIN SCALES - GENERAL: PAINLEVEL_OUTOF10: 4

## 2020-12-03 NOTE — ED PROVIDER NOTES
CHIEF COMPLAINT    Chief Complaint   Patient presents with    Abdominal Pain     HPI  Goldie Nolasco is a 54 y.o. female who presents to the ED with complaints of abdominal pain. Patient states she has been experiencing increasing pain in the abdomen for 3 days. The pain is located predominantly in the right lower quadrant and describes a stabbing throbbing pain rated 7/10. Pain is exacerbated with certain movements and positions. Her pain is constant. Nothing seems to make it better. Patient has never experienced symptoms such as this in the past.  No prior abdominal surgeries other than tubal ligation. She denies fevers, chills, nausea, vomiting, constipation, diarrhea, dysuria, vaginal bleeding, vaginal discharge, flank pain. REVIEW OF SYSTEMS  Constitutional: No fever, chills or recent illness. Eye: No visual changes  HENT: No earache or sore throat. Resp: No SOB or productive cough. Cardio: No chest pain or palpitations. GI: No nausea, vomiting, constipation or diarrhea. No melena. Complains of abdominal pain  : No dysuria, urgency or frequency. Endocrine: No heat intolerance, no cold intolerance, no polydipsia   Lymphatics: No adenopathy  Musculoskeletal: No new muscle aches or joint pain. Neuro: No headaches. Psych: No homicidal or suicidal thoughts  Skin: No rash, No itching. ?  ?   PAST MEDICAL HISTORY  Past Medical History:   Diagnosis Date    Helicobacter pylori (H. pylori) 8/27/12    Migraines     Reflux      FAMILY HISTORY  Family History   Problem Relation Age of Onset    Depression Mother     Stroke Father     Alcohol Abuse Maternal Uncle     Cancer Maternal Uncle         throat    Diabetes Maternal Grandmother     High Blood Pressure Maternal Grandmother     Mental Retardation Maternal Cousin     Allergy (Severe) Other     Learning Disabilities Other         son     SOCIAL HISTORY  Social History     Socioeconomic History    Marital status:      Spouse name: None    Number of children: None    Years of education: None    Highest education level: None   Occupational History    None   Social Needs    Financial resource strain: None    Food insecurity     Worry: None     Inability: None    Transportation needs     Medical: None     Non-medical: None   Tobacco Use    Smoking status: Passive Smoke Exposure - Never Smoker    Smokeless tobacco: Never Used    Tobacco comment:  smokes in house    Substance and Sexual Activity    Alcohol use: No    Drug use: No    Sexual activity: Yes     Partners: Male   Lifestyle    Physical activity     Days per week: None     Minutes per session: None    Stress: None   Relationships    Social connections     Talks on phone: None     Gets together: None     Attends Anglican service: None     Active member of club or organization: None     Attends meetings of clubs or organizations: None     Relationship status: None    Intimate partner violence     Fear of current or ex partner: None     Emotionally abused: None     Physically abused: None     Forced sexual activity: None   Other Topics Concern    None   Social History Narrative    None       SURGICAL HISTORY  Past Surgical History:   Procedure Laterality Date    ABDOMEN SURGERY      TUBAL LIGATION       CURRENT MEDICATIONS  Previous Medications    ALBUTEROL SULFATE HFA (PROVENTIL HFA) 108 (90 BASE) MCG/ACT INHALER    Inhale 2 puffs into the lungs every 6 hours as needed for Wheezing or Shortness of Breath    CETIRIZINE HCL 10 MG CAPS    Take 10 mg by mouth daily    EMOLLIENT (DERMAPHOR) OINT OINTMENT        FEXOFENADINE (ALLEGRA ALLERGY) 180 MG TABLET    Take 180 mg by mouth daily    FLUTICASONE (FLONASE) 50 MCG/ACT NASAL SPRAY        NAPROXEN (NAPROSYN) 500 MG TABLET    Take 1 tablet by mouth 2 times daily as needed for Pain    NAPROXEN (NAPROSYN) 500 MG TABLET    Take 1 tablet by mouth 2 times daily as needed for Pain    ONDANSETRON (ZOFRAN ODT) 4 MG DISINTEGRATING TABLET    Take 1 tablet by mouth every 8 hours as needed for Nausea    RANITIDINE (ZANTAC) 150 MG TABLET    Take 150 mg by mouth 2 times daily    SUMATRIPTAN (IMITREX) 25 MG TABLET        TRAZODONE (DESYREL) 50 MG TABLET         ALLERGIES  No Known Allergies  PHYSICAL EXAM  VITAL SIGNS:   ED Triage Vitals [12/02/20 2045]   Enc Vitals Group      BP (!) 148/131      Pulse 116      Resp 16      Temp 97.9 °F (36.6 °C)      Temp Source Oral      SpO2 100 %      Weight 260 lb (117.9 kg)      Height 5' 6\" (1.676 m)      Head Circumference       Peak Flow       Pain Score       Pain Loc       Pain Edu? Excl. in [address]? Constitutional: Well developed, obese, nontoxic-appearing  HENT: Normocephalic, Atraumatic, Bilateral external ears normal, Oropharynx moist, No oral exudates, Nose normal.   Eyes: PERRL, EOMI, Conjunctiva normal, No discharge. No scleral icterus. Neck: Normal range of motion, No tenderness, Supple. Lymphatic: No lymphadenopathy noted. Cardiovascular: Tachycardic, normal rhythm, No murmurs, gallops or rubs. Thorax & Lungs: Normal breath sounds, No respiratory distress, No wheezing. Abdomen: Soft, tenderness to palpation the right lower quadrant of the abdomen without rebound or guarding, negative Rovsing sign, negative psoas sign, no masses, No pulsatile masses, No distention, Normal bowel sounds  Skin: Warm, Dry, Pink, No mottling, No erythema, No rash. Back: No tenderness, No CVA tenderness. Extremities: No edema, No tenderness, No cyanosis, Normal perfusion, No clubbing. Musculoskeletal: Good range of motion in all major joints as observed. No major deformities noted. Neurologic: Alert & oriented x 3, Normal motor function, Normal sensory function, CN II-XII grossly intact as tested, No focal deficits noted.    Psychiatric: Affect normal, Judgment normal, Mood normal.   EKG  NA  RADIOLOGY  Labs Reviewed   COMPREHENSIVE METABOLIC PANEL W/ REFLEX TO MG FOR LOW K - Abnormal; Notable for the following components:       Result Value    Sodium 133 (*)     Glucose 106 (*)     ALT 9 (*)     All other components within normal limits   URINALYSIS - Abnormal; Notable for the following components:    Bilirubin Urine SMALL (*)     Blood, Urine SMALL (*)     Mucus, UA RARE (*)     All other components within normal limits   CULTURE, URINE   CBC WITH AUTO DIFFERENTIAL   LIPASE   PREGNANCY, URINE   URINE DRUG SCREEN   ICTOTEST, URINE     I personally reviewed the images. The radiologist's interpretation reveals:  Last Imaging results   CT ABDOMEN PELVIS W IV CONTRAST Additional Contrast? None   Preliminary Result   No acute findings are seen within the abdomen or pelvis. The appendix is   normal.      Colonic diverticulosis. Procedures  NA  MEDS GIVEN IN ED:  Medications   sodium chloride flush 0.9 % injection 10 mL (has no administration in time range)   0.9 % sodium chloride bolus (0 mLs Intravenous Stopped 12/3/20 0003)   iopamidol (ISOVUE-370) 76 % injection 80 mL (80 mLs Intravenous Given 12/2/20 2337)   fentaNYL (SUBLIMAZE) injection 50 mcg (50 mcg Intravenous Given 12/2/20 2343)   ondansetron (ZOFRAN) injection 4 mg (4 mg Intravenous Given 12/2/20 2343)     COURSE & MEDICAL DECISION MAKING  79-year-old female presents emergency department complaints of increasing abdominal pain predominantly right lower quadrant over the last 3 days. Initial vital signs remarkable for elevated blood pressure and tachycardia rate of 116. On exam she has tender to palpation of the right lower quadrant without rebound or guarding. She has no flank pain. At this time will obtain CBC, CMP, lipase, urine pregnancy screen, urinalysis, CT of the abdomen/pelvis. In the interim, patient provided with IV fluid bolus, fentanyl, Zofran. Pregnancy screen is negative. CBC, CMP, and lipase are reassuring. CT of the abdomen/pelvis is without acute intra-abdominal pathology.   Urinalysis is without evidence of infection. Following aforementioned inventions the patient's pain is resolved. At this time given reassuring evaluation here I feel she is appropriate for discharge home. Provided prescription for Bentyl and instructed to follow-up with her primary care provider. Return precautions provided. Appropriate PPE utilized as indicated for entire patient encounter? Time of Disposition: See timeline  ? New Prescriptions    DICYCLOMINE (BENTYL) 20 MG TABLET    Take 1 tablet by mouth 3 times daily as needed (Pain)     FINAL IMPRESSION  1. Abdominal pain, right lower quadrant        Electronically signed by:  Carlitos Lyons DO, 12/3/2020         Carlitos Lyons DO  12/03/20 7182

## 2020-12-03 NOTE — ED PROVIDER NOTES
As physician-in-triage, I performed a medical screening history and physical exam on this patient. HISTORY OF PRESENT ILLNESS  Ginette Spence is a 54 y.o. female who presents emergency department with chief complaint of abdominal pain that is located in the right lower quadrant. Patient denies previous abdominal surgeries. Patient denies other associated symptoms at this time. Lab work including blood, urine and CT abdomen pelvis was ordered from triage. Vital signs are normal and stable except for mild hypertension and tachycardia. Patient is otherwise well-appearing. Patient will be evaluated by physicians at [facility]. PHYSICAL EXAM  BP (!) 148/131   Pulse 116   Temp 97.9 °F (36.6 °C) (Oral)   Resp 16   Ht 5' 6\" (1.676 m)   Wt 260 lb (117.9 kg)   SpO2 100%   BMI 41.97 kg/m²     On exam, the patient appears in no acute distress. Speech is clear. Breathing is unlabored. Moves all extremities    Comment: Please note this report has been produced using speech recognition software and may contain errors related to that system including errors in grammar, punctuation, and spelling, as well as words and phrases that may be inappropriate. If there are any questions or concerns please feel free to contact the dictating provider for clarification.      April Oleary, DO  12/02/20 5681

## 2020-12-03 NOTE — ED TRIAGE NOTES
Pt presented to the ED via triage with c/o RLQ abdominal pain radiating into R flank x 3 days. Pt seen PCP and prescribed medication and has had no relief. Pt denies dysuria. Pt is A&O, skin is wd and RR e/u.

## 2020-12-04 LAB
CULTURE: NORMAL
Lab: NORMAL
SPECIMEN: NORMAL

## 2021-04-21 ENCOUNTER — HOSPITAL ENCOUNTER (EMERGENCY)
Age: 46
Discharge: HOME | End: 2021-04-21
Attending: EMERGENCY MEDICINE
Payer: COMMERCIAL

## 2021-04-21 ENCOUNTER — APPOINTMENT (OUTPATIENT)
Dept: CT IMAGING | Age: 46
End: 2021-04-21
Payer: COMMERCIAL

## 2021-04-21 VITALS
OXYGEN SATURATION: 100 % | HEIGHT: 67 IN | WEIGHT: 256 LBS | RESPIRATION RATE: 20 BRPM | TEMPERATURE: 98 F | HEART RATE: 83 BPM | BODY MASS INDEX: 40.18 KG/M2 | DIASTOLIC BLOOD PRESSURE: 77 MMHG | SYSTOLIC BLOOD PRESSURE: 119 MMHG

## 2021-04-21 DIAGNOSIS — R10.31 ABDOMINAL PAIN, RIGHT LOWER QUADRANT: Primary | ICD-10-CM

## 2021-04-21 DIAGNOSIS — S39.011A STRAIN OF ABDOMINAL WALL, INITIAL ENCOUNTER: ICD-10-CM

## 2021-04-21 LAB
ANION GAP SERPL CALCULATED.3IONS-SCNC: 6 MMOL/L (ref 4–16)
ANISOCYTOSIS: ABNORMAL
ATYPICAL LYMPHOCYTE ABSOLUTE COUNT: ABNORMAL
BACTERIA: ABNORMAL /HPF
BANDED NEUTROPHILS ABSOLUTE COUNT: 0.34 K/CU MM
BANDED NEUTROPHILS RELATIVE PERCENT: 6 % (ref 5–11)
BILIRUBIN URINE: NEGATIVE MG/DL
BLOOD, URINE: NEGATIVE
BUN BLDV-MCNC: 7 MG/DL (ref 6–23)
CALCIUM SERPL-MCNC: 9 MG/DL (ref 8.3–10.6)
CHLORIDE BLD-SCNC: 104 MMOL/L (ref 99–110)
CLARITY: CLEAR
CO2: 27 MMOL/L (ref 21–32)
COLOR: YELLOW
CREAT SERPL-MCNC: 0.7 MG/DL (ref 0.6–1.1)
DIFFERENTIAL TYPE: ABNORMAL
DOHLE BODIES: PRESENT
EOSINOPHILS ABSOLUTE: 0.1 K/CU MM
EOSINOPHILS RELATIVE PERCENT: 2 % (ref 0–3)
GFR AFRICAN AMERICAN: >60 ML/MIN/1.73M2
GFR NON-AFRICAN AMERICAN: >60 ML/MIN/1.73M2
GLUCOSE BLD-MCNC: 94 MG/DL (ref 70–99)
GLUCOSE, URINE: NEGATIVE MG/DL
HCT VFR BLD CALC: 38.6 % (ref 37–47)
HEMOGLOBIN: 12.4 GM/DL (ref 12.5–16)
INTERPRETATION: NORMAL
KETONES, URINE: NEGATIVE MG/DL
LEUKOCYTE ESTERASE, URINE: NEGATIVE
LYMPHOCYTES ABSOLUTE: 2.9 K/CU MM
LYMPHOCYTES RELATIVE PERCENT: 51 % (ref 24–44)
MCH RBC QN AUTO: 31.4 PG (ref 27–31)
MCHC RBC AUTO-ENTMCNC: 32.1 % (ref 32–36)
MCV RBC AUTO: 97.7 FL (ref 78–100)
MONOCYTES ABSOLUTE: 0.2 K/CU MM
MONOCYTES RELATIVE PERCENT: 3 % (ref 0–4)
MUCUS: ABNORMAL HPF
NITRITE URINE, QUANTITATIVE: NEGATIVE
PDW BLD-RTO: 13.2 % (ref 11.7–14.9)
PH, URINE: 5 (ref 5–8)
PLATELET # BLD: 292 K/CU MM (ref 140–440)
PMV BLD AUTO: 10.2 FL (ref 7.5–11.1)
POTASSIUM SERPL-SCNC: 4.2 MMOL/L (ref 3.5–5.1)
PREGNANCY, URINE: NEGATIVE
PROTEIN UA: NEGATIVE MG/DL
RBC # BLD: 3.95 M/CU MM (ref 4.2–5.4)
RBC URINE: 1 /HPF (ref 0–6)
SEGMENTED NEUTROPHILS ABSOLUTE COUNT: 2.2 K/CU MM
SEGMENTED NEUTROPHILS RELATIVE PERCENT: 38 % (ref 36–66)
SODIUM BLD-SCNC: 137 MMOL/L (ref 135–145)
SPECIFIC GRAVITY UA: 1.03 (ref 1–1.03)
SPECIFIC GRAVITY, URINE: 1.03 (ref 1–1.03)
SQUAMOUS EPITHELIAL: 2 /HPF
TRICHOMONAS: ABNORMAL /HPF
UROBILINOGEN, URINE: NEGATIVE MG/DL (ref 0.2–1)
WBC # BLD: 5.7 K/CU MM (ref 4–10.5)
WBC UA: <1 /HPF (ref 0–5)

## 2021-04-21 PROCEDURE — 81025 URINE PREGNANCY TEST: CPT

## 2021-04-21 PROCEDURE — 81001 URINALYSIS AUTO W/SCOPE: CPT

## 2021-04-21 PROCEDURE — 80048 BASIC METABOLIC PNL TOTAL CA: CPT

## 2021-04-21 PROCEDURE — 99285 EMERGENCY DEPT VISIT HI MDM: CPT

## 2021-04-21 PROCEDURE — 85007 BL SMEAR W/DIFF WBC COUNT: CPT

## 2021-04-21 PROCEDURE — 74176 CT ABD & PELVIS W/O CONTRAST: CPT

## 2021-04-21 PROCEDURE — 85027 COMPLETE CBC AUTOMATED: CPT

## 2021-04-21 PROCEDURE — 6360000002 HC RX W HCPCS: Performed by: EMERGENCY MEDICINE

## 2021-04-21 PROCEDURE — 96372 THER/PROPH/DIAG INJ SC/IM: CPT

## 2021-04-21 RX ORDER — IBUPROFEN 800 MG/1
800 TABLET ORAL 2 TIMES DAILY PRN
Qty: 30 TABLET | Refills: 0 | Status: SHIPPED | OUTPATIENT
Start: 2021-04-21

## 2021-04-21 RX ORDER — KETOROLAC TROMETHAMINE 30 MG/ML
30 INJECTION, SOLUTION INTRAMUSCULAR; INTRAVENOUS ONCE
Status: COMPLETED | OUTPATIENT
Start: 2021-04-21 | End: 2021-04-21

## 2021-04-21 RX ORDER — CYCLOBENZAPRINE HCL 5 MG
5 TABLET ORAL 2 TIMES DAILY PRN
Qty: 10 TABLET | Refills: 0 | Status: SHIPPED | OUTPATIENT
Start: 2021-04-21 | End: 2021-05-01

## 2021-04-21 RX ADMIN — KETOROLAC TROMETHAMINE 30 MG: 30 INJECTION, SOLUTION INTRAMUSCULAR; INTRAVENOUS at 14:05

## 2021-04-21 ASSESSMENT — PAIN SCALES - GENERAL
PAINLEVEL_OUTOF10: 8
PAINLEVEL_OUTOF10: 8

## 2021-04-21 NOTE — ED NOTES
Asked 2 lab technicians to help collect blood and they verbalized they would.      Charly Issa RN  04/21/21 2238

## 2021-04-21 NOTE — ED TRIAGE NOTES
Pt presents with right upper leg pain that radiates towards lower stomach. Pt describes pain as an ache and says it makes it difficult for her to stand and walk. Pt reports this pain began 3 days ago, but comes and goes every 3-4 months for the last 5 years. Vitals stable, alert and oriented x4. No other signs of distress. Pt has no other complaints at this time.

## 2021-04-21 NOTE — ED PROVIDER NOTES
Relation Age of Onset    Depression Mother     Stroke Father     Alcohol Abuse Maternal Uncle     Cancer Maternal Uncle         throat    Diabetes Maternal Grandmother     High Blood Pressure Maternal Grandmother     Mental Retardation Maternal Cousin     Allergy (Severe) Other     Learning Disabilities Other         son       SOCIAL HISTORY  Social History     Socioeconomic History    Marital status:      Spouse name: None    Number of children: None    Years of education: None    Highest education level: None   Occupational History    None   Social Needs    Financial resource strain: None    Food insecurity     Worry: None     Inability: None    Transportation needs     Medical: None     Non-medical: None   Tobacco Use    Smoking status: Passive Smoke Exposure - Never Smoker    Smokeless tobacco: Never Used    Tobacco comment:  smokes in house    Substance and Sexual Activity    Alcohol use: No    Drug use: No    Sexual activity: Yes     Partners: Male   Lifestyle    Physical activity     Days per week: None     Minutes per session: None    Stress: None   Relationships    Social connections     Talks on phone: None     Gets together: None     Attends Shinto service: None     Active member of club or organization: None     Attends meetings of clubs or organizations: None     Relationship status: None    Intimate partner violence     Fear of current or ex partner: None     Emotionally abused: None     Physically abused: None     Forced sexual activity: None   Other Topics Concern    None   Social History Narrative    None       SURGICAL HISTORY  Past Surgical History:   Procedure Laterality Date    ABDOMEN SURGERY      TUBAL LIGATION         CURRENT MEDICATIONS  No current facility-administered medications on file prior to encounter.       Current Outpatient Medications on File Prior to Encounter   Medication Sig Dispense Refill    dicyclomine (BENTYL) 20 MG tablet Take 1 tablet by mouth 3 times daily as needed (Pain) 21 tablet 3    Cetirizine HCl 10 MG CAPS Take 10 mg by mouth daily      naproxen (NAPROSYN) 500 MG tablet Take 1 tablet by mouth 2 times daily as needed for Pain 20 tablet 0    albuterol sulfate HFA (PROVENTIL HFA) 108 (90 Base) MCG/ACT inhaler Inhale 2 puffs into the lungs every 6 hours as needed for Wheezing or Shortness of Breath 1 Inhaler 0    ondansetron (ZOFRAN ODT) 4 MG disintegrating tablet Take 1 tablet by mouth every 8 hours as needed for Nausea 15 tablet 0    naproxen (NAPROSYN) 500 MG tablet Take 1 tablet by mouth 2 times daily as needed for Pain 20 tablet 0    SUMAtriptan (IMITREX) 25 MG tablet   0    Emollient (DERMAPHOR) OINT ointment   1    fluticasone (FLONASE) 50 MCG/ACT nasal spray   2    traZODone (DESYREL) 50 MG tablet   2    fexofenadine (ALLEGRA ALLERGY) 180 MG tablet Take 180 mg by mouth daily      ranitidine (ZANTAC) 150 MG tablet Take 150 mg by mouth 2 times daily           ALLERGIES  No Known Allergies    PHYSICAL EXAM  VITAL SIGNS: /77   Pulse 83   Temp 98 °F (36.7 °C) (Oral)   Resp 20   Ht 5' 7\" (1.702 m)   Wt 256 lb (116.1 kg)   SpO2 100%   BMI 40.10 kg/m²   Constitutional: Well developed, Well nourished, resting in bed, active, pleasant  HENT: Normocephalic, Atraumatic, Bilateral external ears normal, Oropharynx moist, No oral exudates, Nose normal.   Eyes: PERRL, EOMI, Conjunctiva normal, No discharge. Neck: Normal range of motion, Supple, No stridor. Cardiovascular: Normal heart rate, Normal rhythm, No murmurs, No rubs, No gallops. Thorax & Lungs: Normal breath sounds, No respiratory distress, No wheezing, No chest tenderness. Abdomen: Bowel sounds normal, Soft reproducible right lower quadrant tenderness, no guarding, no rebound, No masses, No pulsatile masses. Unable to fully palpate for hernia secondary to body habitus  Skin: Warm, Dry, No erythema, No rash.    Extremities: Intact distal pulses, No edema, No tenderness, No cyanosis, No clubbing. Musculoskeletal: Good gross range of motion in all major joints. No major deformities noted. Neurologic: Alert & oriented x 3, Normal gross motor function, Normal gross sensory function, No focal deficits noted. Psychiatric: Affect normal        RADIOLOGY/PROCEDURES/LABS  Last Imaging results   CT ABDOMEN PELVIS WO CONTRAST Additional Contrast? None   Final Result   No CT evidence of acute intra-abdominal process. Imaging reviewed by myself    Labs Reviewed   CBC WITH AUTO DIFFERENTIAL - Abnormal; Notable for the following components:       Result Value    RBC 3.95 (*)     Hemoglobin 12.4 (*)     MCH 31.4 (*)     Lymphocytes % 51.0 (*)     All other components within normal limits   URINALYSIS - Abnormal; Notable for the following components:    Bacteria, UA RARE (*)     Mucus, UA RARE (*)     All other components within normal limits   BASIC METABOLIC PANEL W/ REFLEX TO MG FOR LOW K   PREGNANCY, URINE         Medications   ketorolac (TORADOL) injection 30 mg (30 mg Intramuscular Given 4/21/21 1405)       COURSE & MEDICAL DECISION MAKING  Pertinent Labs & Imaging studies reviewed. (See chart for details)    69-year-old female presents with right lower quadrant pain, most likely abdominal muscle strain. CT scan without evidence of hernia, appendicitis, colitis. UA not suggestive of UTI, pregnancy negative, low clinical suspicion for ovarian torsion or ovarian cyst.  Denies any concern for STDs or pelvic discharge. She will be treated symptomatically with antiinflammatories, muscle relaxer and rest and stretching advised. Discharged with strict return precautions but no place, nontoxic, hemodynamically stable with low clinical suspicion for acute surgical abdomen.         FINAL IMPRESSION  Problem List Items Addressed This Visit     None      Visit Diagnoses     Abdominal pain, right lower quadrant    -  Primary    Strain of abdominal wall, initial encounter          1.    2.   3.    Patient gave me permission to discuss medical history, care, and plan with those present in the room.   Electronically signed by: Elias Acosta MD, 4/21/2021  MD Elias Fuentes MD  04/21/21 4697

## 2021-05-06 ENCOUNTER — HOSPITAL ENCOUNTER (EMERGENCY)
Age: 46
Discharge: HOME | End: 2021-05-06
Payer: COMMERCIAL

## 2021-05-06 ENCOUNTER — APPOINTMENT (OUTPATIENT)
Dept: CT IMAGING | Age: 46
End: 2021-05-06
Payer: COMMERCIAL

## 2021-05-06 VITALS
OXYGEN SATURATION: 99 % | RESPIRATION RATE: 18 BRPM | SYSTOLIC BLOOD PRESSURE: 139 MMHG | TEMPERATURE: 98.2 F | HEART RATE: 89 BPM | DIASTOLIC BLOOD PRESSURE: 81 MMHG

## 2021-05-06 DIAGNOSIS — S39.012A BACK STRAIN, INITIAL ENCOUNTER: ICD-10-CM
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
## 2021-05-06 PROCEDURE — 99284 EMERGENCY DEPT VISIT MOD MDM: CPT

## 2021-05-06 PROCEDURE — 96372 THER/PROPH/DIAG INJ SC/IM: CPT

## 2021-05-06 PROCEDURE — 72131 CT LUMBAR SPINE W/O DYE: CPT

## 2021-05-06 PROCEDURE — 6360000002 HC RX W HCPCS: Performed by: PHYSICIAN ASSISTANT

## 2021-05-06 PROCEDURE — 72128 CT CHEST SPINE W/O DYE: CPT

## 2021-05-06 RX ORDER — CYCLOBENZAPRINE HCL 10 MG
10 TABLET ORAL 3 TIMES DAILY PRN
Qty: 15 TABLET | Refills: 0 | Status: SHIPPED | OUTPATIENT
Start: 2021-05-06

## 2021-05-06 RX ORDER — ORPHENADRINE CITRATE 30 MG/ML
60 INJECTION INTRAMUSCULAR; INTRAVENOUS ONCE
Status: COMPLETED | OUTPATIENT
Start: 2021-05-06 | End: 2021-05-06

## 2021-05-06 RX ORDER — KETOROLAC TROMETHAMINE 30 MG/ML
60 INJECTION, SOLUTION INTRAMUSCULAR; INTRAVENOUS ONCE
Status: COMPLETED | OUTPATIENT
Start: 2021-05-06 | End: 2021-05-06

## 2021-05-06 RX ORDER — NAPROXEN 500 MG/1
500 TABLET ORAL 2 TIMES DAILY
Qty: 60 TABLET | Refills: 0 | Status: SHIPPED | OUTPATIENT
Start: 2021-05-06

## 2021-05-06 RX ADMIN — KETOROLAC TROMETHAMINE 60 MG: 60 INJECTION, SOLUTION INTRAMUSCULAR at 20:42

## 2021-05-06 RX ADMIN — ORPHENADRINE CITRATE 60 MG: 60 INJECTION INTRAMUSCULAR; INTRAVENOUS at 20:42

## 2021-05-06 ASSESSMENT — PAIN DESCRIPTION - DESCRIPTORS: DESCRIPTORS: BURNING

## 2021-05-06 ASSESSMENT — PAIN DESCRIPTION - PAIN TYPE: TYPE: ACUTE PAIN
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
## 2022-03-31 ENCOUNTER — HOSPITAL ENCOUNTER (EMERGENCY)
Age: 47
Discharge: HOME | End: 2022-03-31
Payer: COMMERCIAL

## 2022-03-31 VITALS
SYSTOLIC BLOOD PRESSURE: 127 MMHG | OXYGEN SATURATION: 98 % | BODY MASS INDEX: 40.1 KG/M2 | DIASTOLIC BLOOD PRESSURE: 78 MMHG | HEART RATE: 90 BPM | RESPIRATION RATE: 15 BRPM | HEIGHT: 67 IN | TEMPERATURE: 98.5 F

## 2022-03-31 DIAGNOSIS — H61.21 IMPACTED CERUMEN OF RIGHT EAR: Primary | ICD-10-CM

## 2022-03-31 PROCEDURE — 6370000000 HC RX 637 (ALT 250 FOR IP): Performed by: PHYSICIAN ASSISTANT

## 2022-03-31 PROCEDURE — 99283 EMERGENCY DEPT VISIT LOW MDM: CPT

## 2022-03-31 RX ADMIN — CARBAMIDE PEROXIDE 6.5% 5 DROP: 6.5 LIQUID AURICULAR (OTIC) at 09:37

## 2022-03-31 ASSESSMENT — PAIN DESCRIPTION - PAIN TYPE: TYPE: ACUTE PAIN

## 2022-03-31 ASSESSMENT — PAIN DESCRIPTION - ORIENTATION: ORIENTATION: RIGHT

## 2022-03-31 ASSESSMENT — PAIN SCALES - GENERAL: PAINLEVEL_OUTOF10: 7

## 2022-03-31 ASSESSMENT — PAIN DESCRIPTION - LOCATION: LOCATION: EAR

## 2022-03-31 NOTE — ED NOTES
Discharged instruction reviewed with patient. All questions addressed. Patient alert and oriented x4 at discharge. Patient verbalized understanding.       Samreen Langston RN  03/31/22 1126

## 2022-03-31 NOTE — ED PROVIDER NOTES
eMERGENCY dEPARTMENT eNCOUnter         [address] [facility]    PCP: Maria Isabel Anton MD    [address] [facility] [place]    Chief Complaint   Patient presents with   Scotty Deluca     rt ear       HPI    Duong Guthrie is a 51 y.o. female who presents with Right ear pain. Onset was x3 days ago. The duration has been constant. The context is patient states that she has been having 7/10 aching throbbing pain in the right ear with ear bleeding or discharge. She does admit to frequent Q-tip use but no recent air travel or swimming activities. Denying any left-sided ear symptoms. Is endorsing muffled hearing out of the right ear without tenderness. No other sick nasal congestion, sore throat, headache, neck pain or stiffness. Has not tried any over-the-counter medications relief of symptoms. REVIEW OF SYSTEMS    General: No fevers, chills  Pulmonary: No difficulty breathing or hemoptysis  General: No fevers or syncope  GI: No vomiting or diarrhea  Neurologic: No dizziness, lightheadedness, numbness/tingling, confusion.     All other review of systems are negative  See HPI and nursing notes for additional information     PAST MEDICAL AND SURGICAL HISTORY    Past Medical History:   Diagnosis Date    Helicobacter pylori (H. pylori) 8/27/12    Migraines     Reflux      Past Surgical History:   Procedure Laterality Date    ABDOMEN SURGERY      TUBAL LIGATION       CURRENT MEDICATIONS    Current Outpatient Rx   Medication Sig Dispense Refill    carbamide peroxide (DEBROX) 6.5 % otic solution Place 5 drops into the right ear 2 times daily 1 each 0    naproxen (NAPROSYN) 500 MG tablet Take 1 tablet by mouth 2 times daily 60 tablet 0    cyclobenzaprine (FLEXERIL) 10 MG tablet Take 1 tablet by mouth 3 times daily as needed for Muscle spasms 15 tablet 0    ibuprofen (ADVIL;MOTRIN) 800 MG tablet Take 1 tablet by mouth 2 times daily as needed for Pain 30 tablet 0    dicyclomine (BENTYL) 20 MG tablet Take 1 tablet by mouth 3 times daily as needed (Pain) 21 tablet 3    Cetirizine HCl 10 MG CAPS Take 10 mg by mouth daily      albuterol sulfate HFA (PROVENTIL HFA) 108 (90 Base) MCG/ACT inhaler Inhale 2 puffs into the lungs every 6 hours as needed for Wheezing or Shortness of Breath 1 Inhaler 0    ondansetron (ZOFRAN ODT) 4 MG disintegrating tablet Take 1 tablet by mouth every 8 hours as needed for Nausea 15 tablet 0    SUMAtriptan (IMITREX) 25 MG tablet   0    Emollient (DERMAPHOR) OINT ointment   1    fluticasone (FLONASE) 50 MCG/ACT nasal spray   2    traZODone (DESYREL) 50 MG tablet   2    fexofenadine (ALLEGRA ALLERGY) 180 MG tablet Take 180 mg by mouth daily      ranitidine (ZANTAC) 150 MG tablet Take 150 mg by mouth 2 times daily       ALLERGIES    No Known Allergies  FAMILY AND SOCIAL HISTORY    Family History   Problem Relation Age of Onset    Depression Mother     Stroke Father     Alcohol Abuse Maternal Uncle     Cancer Maternal Uncle         throat    Diabetes Maternal Grandmother     High Blood Pressure Maternal Grandmother     Mental Retardation Maternal Cousin     Allergy (Severe) Other     Learning Disabilities Other         son     Social History     Socioeconomic History    Marital status:      Spouse name: None    Number of children: None    Years of education: None    Highest education level: None   Occupational History    None   Tobacco Use    Smoking status: Passive Smoke Exposure - Never Smoker    Smokeless tobacco: Never Used    Tobacco comment:  smokes in house    Vaping Use    Vaping Use: Never used   Substance and Sexual Activity    Alcohol use: No    Drug use: No    Sexual activity: Yes     Partners: Male   Other Topics Concern    None   Social History Narrative    None     Social Determinants of Health     Financial Resource Strain:     Difficulty of Paying Living Expenses: Not on file   Food Insecurity:     Worried About Running Out of Food in the Last Year: Not on file    Ran Out of Food in the Last Year: Not on file   Transportation Needs:     Lack of Transportation (Medical): Not on file    Lack of Transportation (Non-Medical): Not on file   Physical Activity:     Days of Exercise per Week: Not on file    Minutes of Exercise per Session: Not on file   Stress:     Feeling of Stress : Not on file   Social Connections:     Frequency of Communication with Friends and Family: Not on file    Frequency of Social Gatherings with Friends and Family: Not on file    Attends Gnosticist Services: Not on file    Active Member of [address] [place] [address] [employer] or Organizations: Not on file    Attends Club or Organization Meetings: Not on file    Marital Status: Not on file   Intimate Partner Violence:     Fear of Current or Ex-Partner: Not on file    Emotionally Abused: Not on file    Physically Abused: Not on file    Sexually Abused: Not on file   Housing Stability:     Unable to Pay for Housing in the Last Year: Not on file    Number of [place] in the Last Year: Not on file    Unstable Housing in the Last Year: Not on file       PHYSICAL EXAM    VITAL SIGNS: /78   Pulse 90   Temp 98.5 °F (36.9 °C)   Resp 15   Ht 5' 7\" (1.702 m)   SpO2 98%   BMI 40.10 kg/m²   Constitutional:  Well developed, well nourished, no acute distress  HEENT:     - Normocephalic, Atraumatic   - PERRL, EOM intact. Conjunctiva normal    -  Frontal/Maxillary sinuses NONtender to percussion.   - Nasal passages patent, nonedematous      No massess.   - Oropharynx is patent, noninjectedwithout tonsillar hypertrophy or exudate. No trismus   - No swollen lymph nodes. Ears:  - External ears normal appearing without redness or edema.   - No mastoid tenderness bilaterally. - No tenderness to palpation of the pinna or tragus bilaterally. - Left EAC is clear without erythema or edema.   Left TM is pearly grey and translucent without discharge, fluid or bulging.     - Right EAC is impacted with dark yellow-brown cerumen. Unable to visualize right TM given cerumen impaction. Respiratory:  Lungs clear to auscultation, no wheezes/rhonchi/rales, no retractions  Cardiovascular:  Normal rate, normal rhythm, no murmurs  Musculoskeletal:  No edema   Neurologic: Awake alert and oriented, and no slurred speech  Integument:  Skin is warm and dry, no rash    I have reviewed and interpreted all of the currently available lab results from this visit (if applicable):  No results found for this visit on 03/31/22. ED COURSE & MEDICAL DECISION MAKING       Vital signs and nursing notes reviewed during ED course. I have independently evaluated this patient . Supervising MD - Dr. Florina Obrien - present in the Emergency Department, available for consultation, throughout entirety of  patient care. All pertinent Lab data and radiographic results reviewed with patient at bedside. The patient and/or the family were informed of the results of any tests/labs/imaging, the treatment plan, and time was allotted to answer questions. Differential diagnoses: Mastoiditis, Auricular cellulitis, Malignant otitis externa, Otitis media, Subarachnoid hemorrhage, Odontogenic infection, TMJ syndrome, other. Clinical  IMPRESSION    1. Impacted cerumen of right ear        Patient presents with right-sided ear pain and muffled hearing. On exam, well-appearing nontoxic 25-year-old female, afebrile in no acute respiratory distress. HEENT exam is remarkable for right sided cerumen impaction with dark yellow-brown earwax, not able to visualize right TM. No mastoid tenderness bilaterally. No pain on tragus/pinna manipulation bilaterally. Left EAC is clear. Did order Debrox and cerumen disimpaction performed by ED nurse at bedside however was unsuccessful after multiple flushes with sterile saline, please see ED nurse documentation.   At this time, we discussed continuing Debrox at home, using Q-tip use and following up with ENT for cerumen removal. Do think patient is appropriate for discharge home. No evidence of secondary infection requiring antibiotics at this time will be given outpatient follow-up with ENT as needed as well as continued home Debrox. Educated to avoid placement of foreign bodies including Q-tips into the ear. Low clinical suspicion for otitis media/externa, mastoiditis, auricular cellulitis, malignant otitis externa or subarachnoid hemorrhage. Patient is discharged in stable condition with Debrox and outpatient follow-up with PCP as well as ENT. Encouraged to keep ears clean dry and free of water. Return warnings back to the ED discussed    Diagnosis and plan discussed in detail with patient who understands and agrees. Patient agrees to return emergency department if symptoms worsen or any new symptoms develop. Comment: Please note this report has been produced using speech recognition software and may contain errors related to that system including errors in grammar, punctuation, and spelling, as well as words and phrases that may be inappropriate. If there are any questions or concerns please feel free to contact the dictating provider for clarification.             Kevin Kaiser PA-C  03/31/22 1123

## 2022-05-18 ENCOUNTER — HOSPITAL ENCOUNTER (OUTPATIENT)
Age: 47
Discharge: HOME | End: 2022-05-18
Payer: COMMERCIAL

## 2022-05-18 ENCOUNTER — HOSPITAL ENCOUNTER (OUTPATIENT)
Dept: GENERAL RADIOLOGY | Age: 47
Discharge: HOME | End: 2022-05-18
Payer: COMMERCIAL

## 2022-05-18 DIAGNOSIS — R10.9 ABDOMINAL PAIN IN FEMALE: ICD-10-CM

## 2022-05-18 PROCEDURE — 74019 RADEX ABDOMEN 2 VIEWS: CPT

## 2022-05-19 ENCOUNTER — HOSPITAL ENCOUNTER (OUTPATIENT)
Dept: WOMENS IMAGING | Age: 47
Discharge: HOME | End: 2022-05-19
Payer: COMMERCIAL

## 2022-05-19 DIAGNOSIS — Z12.39 ENCOUNTER FOR SCREENING FOR MALIGNANT NEOPLASM OF BREAST, UNSPECIFIED SCREENING MODALITY: ICD-10-CM

## 2022-05-19 PROCEDURE — 77063 BREAST TOMOSYNTHESIS BI: CPT

## 2022-10-17 ENCOUNTER — OFFICE VISIT MH/BH (OUTPATIENT)
Dept: BARIATRICS/WEIGHT MGMT | Age: 47
End: 2022-10-17
Payer: COMMERCIAL

## 2022-10-17 VITALS
SYSTOLIC BLOOD PRESSURE: 128 MMHG | HEIGHT: 67 IN | BODY MASS INDEX: 40.98 KG/M2 | DIASTOLIC BLOOD PRESSURE: 74 MMHG | OXYGEN SATURATION: 99 % | HEART RATE: 102 BPM | WEIGHT: 261.1 LBS

## 2022-10-17 DIAGNOSIS — Z79.899 MEDICATION MANAGEMENT: ICD-10-CM

## 2022-10-17 DIAGNOSIS — E66.01 MORBID OBESITY WITH BMI OF 40.0-44.9, ADULT (HCC): Primary | ICD-10-CM

## 2022-10-17 LAB
ALCOHOL URINE: NORMAL
AMPHETAMINE SCREEN, URINE: NEGATIVE
BARBITURATE SCREEN, URINE: NEGATIVE
BENZODIAZEPINE SCREEN, URINE: NEGATIVE
BUPRENORPHINE URINE: NEGATIVE
COCAINE METABOLITE SCREEN URINE: NEGATIVE
FENTANYL SCREEN, URINE: NORMAL
GABAPENTIN SCREEN, URINE: NORMAL
MDMA URINE: NEGATIVE
METHADONE SCREEN, URINE: NEGATIVE
METHAMPHETAMINE, URINE: NEGATIVE
OPIATE SCREEN URINE: NEGATIVE
OXYCODONE SCREEN URINE: NEGATIVE
PHENCYCLIDINE SCREEN URINE: NORMAL
PROPOXYPHENE SCREEN, URINE: NEGATIVE
SYNTHETIC CANNABINOIDS(K2) SCREEN, URINE: NORMAL
THC SCREEN, URINE: NEGATIVE
TRAMADOL SCREEN URINE: NORMAL
TRICYCLIC ANTIDEPRESSANTS, UR: NEGATIVE

## 2022-10-17 PROCEDURE — 80305 DRUG TEST PRSMV DIR OPT OBS: CPT | Performed by: NURSE PRACTITIONER

## 2022-10-17 PROCEDURE — 1036F TOBACCO NON-USER: CPT | Performed by: NURSE PRACTITIONER

## 2022-10-17 PROCEDURE — G8484 FLU IMMUNIZE NO ADMIN: HCPCS | Performed by: NURSE PRACTITIONER

## 2022-10-17 PROCEDURE — G8427 DOCREV CUR MEDS BY ELIG CLIN: HCPCS | Performed by: NURSE PRACTITIONER

## 2022-10-17 PROCEDURE — 99204 OFFICE O/P NEW MOD 45 MIN: CPT | Performed by: NURSE PRACTITIONER

## 2022-10-17 PROCEDURE — G8417 CALC BMI ABV UP PARAM F/U: HCPCS | Performed by: NURSE PRACTITIONER

## 2022-10-17 RX ORDER — PHENTERMINE HYDROCHLORIDE 37.5 MG/1
37.5 TABLET ORAL
Qty: 30 TABLET | Refills: 0 | Status: SHIPPED | OUTPATIENT
Start: 2022-10-17 | End: 2022-11-16

## 2022-10-17 ASSESSMENT — PATIENT HEALTH QUESTIONNAIRE - PHQ9
SUM OF ALL RESPONSES TO PHQ QUESTIONS 1-9: 1
SUM OF ALL RESPONSES TO PHQ QUESTIONS 1-9: 1
SUM OF ALL RESPONSES TO PHQ9 QUESTIONS 1 & 2: 1
SUM OF ALL RESPONSES TO PHQ QUESTIONS 1-9: 1
SUM OF ALL RESPONSES TO PHQ QUESTIONS 1-9: 1
1. LITTLE INTEREST OR PLEASURE IN DOING THINGS: 1
2. FEELING DOWN, DEPRESSED OR HOPELESS: 0

## 2022-10-17 ASSESSMENT — ENCOUNTER SYMPTOMS
ALLERGIC/IMMUNOLOGIC NEGATIVE: 1
EYES NEGATIVE: 1
GASTROINTESTINAL NEGATIVE: 1
RESPIRATORY NEGATIVE: 1

## 2022-10-17 NOTE — PROGRESS NOTES
Chief Complaint   Patient presents with    Weight Management     NP- 1st medication wm visit. Has bar cov. SUBJECTIVE:    HPI: Patient is here with complaints of weight gain and inability to lose weight per self. Inquiring about weight loss medications to assist with their weight loss goal.    Obesity with a BMI of Body mass index is 41.51 kg/m². Miranda Salcido Patient has failed to lose 5% of body weight for many years. Any history of glaucoma? DENIES    Any history of drug abuse? DENIES    Any history of CAD, uncontrolled HTN, arrhythmias, stroke, or CHF? ? DENIES    Any history of hyperthyroidism? DEnies    Any current or recent use of MAOIs? DENIES    Current dietary measures: not currently    Current exercise measures: walks     I have reviewed the patient's(pertinent information to this visit) medical history, family history(scanned in  the [address] [place] [address] under \"patient questioner\"), social history and review of systems with the patient today in the office.           Past Surgical History:   Procedure Laterality Date    ABDOMEN SURGERY      TUBAL LIGATION         Past Medical History:   Diagnosis Date    Helicobacter pylori (H. pylori) 8/27/12    Migraines     Reflux        Family History   Problem Relation Age of Onset    Depression Mother     Stroke Father     Alcohol Abuse Maternal Uncle     Cancer Maternal Uncle         throat    Diabetes Maternal Grandmother     High Blood Pressure Maternal Grandmother     Mental Retardation Maternal Cousin     Allergy (Severe) Other     Learning Disabilities Other         son    Breast Cancer Neg Hx     Ovarian Cancer Neg Hx        Social History     Socioeconomic History    Marital status:      Spouse name: Not on file    Number of children: Not on file    Years of education: Not on file    Highest education level: Not on file   Occupational History    Not on file   Tobacco Use    Smoking status: Passive Smoke Exposure - Never Smoker    Smokeless tobacco: Never Tobacco comments:      smokes in house    Vaping Use    Vaping Use: Never used   Substance and Sexual Activity    Alcohol use: Yes     Comment: rarely    Drug use: No    Sexual activity: Yes     Partners: Male   Other Topics Concern    Not on file   Social History Narrative    Not on file     Social Determinants of Health     Financial Resource Strain: Not on file   Food Insecurity: Not on file   Transportation Needs: Not on file   Physical Activity: Not on file   Stress: Not on file   Social Connections: Not on file   Intimate Partner Violence: Not on file   Housing Stability: Not on file       Current Outpatient Medications   Medication Sig Dispense Refill    phentermine (ADIPEX-P) 37.5 MG tablet Take 1 tablet by mouth every morning (before breakfast) for 30 days.  BMI 44. 30 tablet 0    naproxen (NAPROSYN) 500 MG tablet Take 1 tablet by mouth 2 times daily 60 tablet 0    cyclobenzaprine (FLEXERIL) 10 MG tablet Take 1 tablet by mouth 3 times daily as needed for Muscle spasms 15 tablet 0    ibuprofen (ADVIL;MOTRIN) 800 MG tablet Take 1 tablet by mouth 2 times daily as needed for Pain 30 tablet 0    dicyclomine (BENTYL) 20 MG tablet Take 1 tablet by mouth 3 times daily as needed (Pain) 21 tablet 3    Cetirizine HCl 10 MG CAPS Take 10 mg by mouth daily      albuterol sulfate HFA (PROVENTIL HFA) 108 (90 Base) MCG/ACT inhaler Inhale 2 puffs into the lungs every 6 hours as needed for Wheezing or Shortness of Breath 1 Inhaler 0    ondansetron (ZOFRAN ODT) 4 MG disintegrating tablet Take 1 tablet by mouth every 8 hours as needed for Nausea 15 tablet 0    SUMAtriptan (IMITREX) 25 MG tablet   0    Emollient (DERMAPHOR) OINT ointment   1    fluticasone (FLONASE) 50 MCG/ACT nasal spray   2    traZODone (DESYREL) 50 MG tablet   2    fexofenadine (ALLEGRA) 180 MG tablet Take 180 mg by mouth daily      ranitidine (ZANTAC) 150 MG tablet Take 150 mg by mouth 2 times daily (Patient not taking: Reported on 10/17/2022)       No current facility-administered medications for this visit. No Known Allergies    Review of Systems:         Review of Systems   Constitutional: Negative. HENT: Negative. Eyes: Negative. Respiratory: Negative. Cardiovascular: Negative. Gastrointestinal: Negative. Endocrine: Negative. Genitourinary: Negative. Musculoskeletal: Negative. Skin: Negative. Allergic/Immunologic: Negative. Neurological: Negative. Hematological: Negative. Psychiatric/Behavioral: Negative. OBJECTIVE:  Physical Exam:    /74 (Site: Left Upper Arm, Position: Sitting, Cuff Size: Medium Adult)   Pulse (!) 102   Ht 5' 6.5\" (1.689 m)   Wt 261 lb 1.6 oz (118.4 kg)   SpO2 99%   BMI 41.51 kg/m²      Physical Exam  Constitutional:       Appearance: Normal appearance. She is obese. HENT:      Head: Normocephalic. Nose: Nose normal.      Mouth/Throat:      Pharynx: Oropharynx is clear. Eyes:      Conjunctiva/sclera: Conjunctivae normal.      Pupils: Pupils are equal, round, and reactive to light. Cardiovascular:      Rate and Rhythm: Normal rate. Pulses: Normal pulses. Pulmonary:      Effort: Pulmonary effort is normal.   Abdominal:      Palpations: Abdomen is soft. Musculoskeletal:         General: Normal range of motion. Cervical back: Normal range of motion. Skin:     General: Skin is warm and dry. Capillary Refill: Capillary refill takes less than 2 seconds. Neurological:      General: No focal deficit present. Mental Status: She is alert and oriented to person, place, and time. Psychiatric:         Mood and Affect: Mood normal.         Behavior: Behavior normal.         ASSESSMENT:  1. Morbid obesity with BMI of 40.0-44.9, adult ([facility] [address]  - Start tracking calories; about 8565-0196 daily  - 64 oz water daily  - Increase activity as able  - Referral RD    2.  Medication management  - Recent CBC, CMP, EKG reviewed  - UDS completed in office and negative  - Meets criteria to start Adipex  - Take as directed  - Call for any concerns or side effects  - RTC one month      PLAN:    -Check EKG, UDS, CBC, and CMP prior to starting. Any abnormalities will be addressed prior to starting medication .    -Will plan on starting Adipex with next visit if Labs, UDS, and EKG WNL.     -BMI is over 30, or over 27 with weight-related risk factors.    -Pt aware Adipex can only be used short term (3 months). -Pt aware no breaks in treatment allowed or must be off for 6 months.    -Pt aware that weight must be lost at each visit or medication will be discontinued.     -Pt is aware that in order to be successful, must combine Adipex with appropriate diet and exercise plan. -Pt provided with medication handout that covers use; side effects (common side effects include insomnia, dry mouth, constipation, nervousness, increased heart rate, hypertension); precautions; and interactions.     -Pt aware must meet monthly in person while on this medication.     -Check [place] [facility] Rx Reporting System. Any concerns: NONE Reported     -Current birth control measures include: tubal ligation    -If elderly or renal impairment, will use lower dose (15 mg daily) and avoid all together if GFR is less than 15. N/A       Orders Placed This Encounter   Procedures    POCT Rapid Drug Screen          Orders Placed This Encounter   Medications    phentermine (ADIPEX-P) 37.5 MG tablet     Sig: Take 1 tablet by mouth every morning (before breakfast) for 30 days. BMI 44. Dispense:  30 tablet     Refill:  0          Follow Up:  Return in about 1 month (around 11/17/2022).       Reji Briceño, APRN - CNP

## 2022-11-17 ENCOUNTER — OFFICE VISIT (OUTPATIENT)
Dept: BARIATRICS/WEIGHT MGMT | Age: 47
End: 2022-11-17
Payer: COMMERCIAL

## 2022-11-17 VITALS
BODY MASS INDEX: 39.36 KG/M2 | HEART RATE: 82 BPM | HEIGHT: 67 IN | WEIGHT: 250.8 LBS | DIASTOLIC BLOOD PRESSURE: 76 MMHG | OXYGEN SATURATION: 100 % | SYSTOLIC BLOOD PRESSURE: 116 MMHG

## 2022-11-17 DIAGNOSIS — E66.9 OBESITY (BMI 30-39.9): Primary | ICD-10-CM

## 2022-11-17 DIAGNOSIS — Z79.899 MEDICATION MANAGEMENT: ICD-10-CM

## 2022-11-17 PROCEDURE — G8427 DOCREV CUR MEDS BY ELIG CLIN: HCPCS | Performed by: NURSE PRACTITIONER

## 2022-11-17 PROCEDURE — 1036F TOBACCO NON-USER: CPT | Performed by: NURSE PRACTITIONER

## 2022-11-17 PROCEDURE — 99213 OFFICE O/P EST LOW 20 MIN: CPT | Performed by: NURSE PRACTITIONER

## 2022-11-17 PROCEDURE — G8484 FLU IMMUNIZE NO ADMIN: HCPCS | Performed by: NURSE PRACTITIONER

## 2022-11-17 PROCEDURE — G8417 CALC BMI ABV UP PARAM F/U: HCPCS | Performed by: NURSE PRACTITIONER

## 2022-11-17 RX ORDER — PHENTERMINE HYDROCHLORIDE 37.5 MG/1
37.5 TABLET ORAL
Qty: 30 TABLET | Refills: 0 | Status: SHIPPED | OUTPATIENT
Start: 2022-11-17 | End: 2022-12-17

## 2022-11-17 NOTE — PROGRESS NOTES
Chief Complaint   Patient presents with    Weight Management     2nd adipex wm         SUBJECTIVE:    HPI: Patient is here with complaints of: Monthly Adipex visit. Obesity with a BMI of Body mass index is 39.87 kg/m². .    Current weight: 250 pounds    Weight change since last visit: -10.3 pounds (if pt failed to lose weight, cannot remain on medication). Month 2 of 3 of being on Adipex. Any new absolute contraindications (glaucoma, drug abuse, CAD, uncontrolled HTN, arrhythmias, stroke, CHF, hyperthyroidism, MAOI use, pregnant or breastfeeding) to being on medication: DENIES     Pt complains of the following side effects: DENIES    Current dietary measures: eating healthier    Current exercise measures: walking her dog    I have reviewed the patient's(pertinent information to this visit) medical history, family history(scanned in  the [address] [place] [address] under \"patient questioner\"), social history and review of systems with the patient today in the office.           Past Surgical History:   Procedure Laterality Date    ABDOMEN SURGERY      TUBAL LIGATION         Past Medical History:   Diagnosis Date    Helicobacter pylori (H. pylori) 8/27/12    Migraines     Reflux        Family History   Problem Relation Age of Onset    Depression Mother     Stroke Father     Alcohol Abuse Maternal Uncle     Cancer Maternal Uncle         throat    Diabetes Maternal Grandmother     High Blood Pressure Maternal Grandmother     Mental Retardation Maternal Cousin     Allergy (Severe) Other     Learning Disabilities Other         son    Breast Cancer Neg Hx     Ovarian Cancer Neg Hx        Social History     Socioeconomic History    Marital status:      Spouse name: Not on file    Number of children: Not on file    Years of education: Not on file    Highest education level: Not on file   Occupational History    Not on file   Tobacco Use    Smoking status: Never     Passive exposure: Yes    Smokeless tobacco: Never    Tobacco comments:      smokes in house    Vaping Use    Vaping Use: Never used   Substance and Sexual Activity    Alcohol use: Yes     Comment: rarely    Drug use: No    Sexual activity: Yes     Partners: Male   Other Topics Concern    Not on file   Social History Narrative    Not on file     Social Determinants of Health     Financial Resource Strain: Not on file   Food Insecurity: Not on file   Transportation Needs: Not on file   Physical Activity: Not on file   Stress: Not on file   Social Connections: Not on file   Intimate Partner Violence: Not on file   Housing Stability: Not on file       Current Outpatient Medications   Medication Sig Dispense Refill    phentermine (ADIPEX-P) 37.5 MG tablet Take 1 tablet by mouth every morning (before breakfast) for 30 days.  BMI 44. 30 tablet 0    naproxen (NAPROSYN) 500 MG tablet Take 1 tablet by mouth 2 times daily 60 tablet 0    cyclobenzaprine (FLEXERIL) 10 MG tablet Take 1 tablet by mouth 3 times daily as needed for Muscle spasms 15 tablet 0    ibuprofen (ADVIL;MOTRIN) 800 MG tablet Take 1 tablet by mouth 2 times daily as needed for Pain 30 tablet 0    dicyclomine (BENTYL) 20 MG tablet Take 1 tablet by mouth 3 times daily as needed (Pain) 21 tablet 3    Cetirizine HCl 10 MG CAPS Take 10 mg by mouth daily      albuterol sulfate HFA (PROVENTIL HFA) 108 (90 Base) MCG/ACT inhaler Inhale 2 puffs into the lungs every 6 hours as needed for Wheezing or Shortness of Breath 1 Inhaler 0    ondansetron (ZOFRAN ODT) 4 MG disintegrating tablet Take 1 tablet by mouth every 8 hours as needed for Nausea 15 tablet 0    SUMAtriptan (IMITREX) 25 MG tablet   0    Emollient (DERMAPHOR) OINT ointment   1    fluticasone (FLONASE) 50 MCG/ACT nasal spray   2    traZODone (DESYREL) 50 MG tablet   2    fexofenadine (ALLEGRA) 180 MG tablet Take 180 mg by mouth daily      ranitidine (ZANTAC) 150 MG tablet Take 150 mg by mouth 2 times daily (Patient not taking: Reported on 10/17/2022)       No current facility-administered medications for this visit. No Known Allergies    Review of Systems:         Review of Systems   All other systems reviewed and are negative. OBJECTIVE:  Physical Exam:    /76   Pulse 82   Ht 5' 6.5\" (1.689 m)   Wt 250 lb 12.8 oz (113.8 kg)   SpO2 100%   BMI 39.87 kg/m²      Physical Exam  Constitutional:       Appearance: Normal appearance. She is obese. HENT:      Head: Normocephalic. Nose: Nose normal.      Mouth/Throat:      Pharynx: Oropharynx is clear. Eyes:      Conjunctiva/sclera: Conjunctivae normal.      Pupils: Pupils are equal, round, and reactive to light. Cardiovascular:      Rate and Rhythm: Normal rate. Pulses: Normal pulses. Pulmonary:      Effort: Pulmonary effort is normal.      Breath sounds: Normal breath sounds. Abdominal:      Palpations: Abdomen is soft. Musculoskeletal:         General: Normal range of motion. Cervical back: Normal range of motion. Skin:     General: Skin is warm and dry. Capillary Refill: Capillary refill takes less than 2 seconds. Neurological:      General: No focal deficit present. Mental Status: She is alert and oriented to person, place, and time. Psychiatric:         Mood and Affect: Mood normal.         Behavior: Behavior normal.         ASSESSMENT:  1. Obesity (BMI 30-39.9)  - Continue tracking calories; about  6619-8966 daily  - 64 oz water daily  - Increase activity as able    2. Medication management  - Doing well; down 10.3 pounds since last visit  - Denies any side effects  - RX refill sent      PLAN:    -Continue Adipex. RX REFILLED.    -BMI is over 30, or over 27 with weight-related risk factors.     -Pt demonstrated weight loss since last visit.    -Pt aware Adipex can only be used short term (3 months).     -Pt aware no breaks in treatment allowed or must be off for 6 months.    -Pt aware that weight must be lost at each visit or medication will be discontinued.     -Pt is aware that in order to be successful, must combine Adipex with appropriate diet and exercise plan. -Pt aware must continue to meet monthly in person while on this medication. F/u in one month. -Check [place] Automated Rx Reporting System. Any concerns: NONE Reported     - Current birth control measures include: tubal ligation    -If elderly or renal impairment, will use lower dose (15 mg daily) and avoid all together if GFR is less than 15. N/A      No orders of the defined types were placed in this encounter. Orders Placed This Encounter   Medications    phentermine (ADIPEX-P) 37.5 MG tablet     Sig: Take 1 tablet by mouth every morning (before breakfast) for 30 days. BMI 44. Dispense:  30 tablet     Refill:  0          Follow Up:  Return in about 1 month (around 12/17/2022).       JACKIE Apple - CNP

## 2022-12-19 ENCOUNTER — OFFICE VISIT (OUTPATIENT)
Dept: BARIATRICS/WEIGHT MGMT | Age: 47
End: 2022-12-19
Payer: COMMERCIAL

## 2022-12-19 VITALS
SYSTOLIC BLOOD PRESSURE: 132 MMHG | DIASTOLIC BLOOD PRESSURE: 76 MMHG | HEIGHT: 67 IN | HEART RATE: 75 BPM | BODY MASS INDEX: 38.44 KG/M2 | WEIGHT: 244.9 LBS | OXYGEN SATURATION: 100 %

## 2022-12-19 DIAGNOSIS — Z79.899 MEDICATION MANAGEMENT: ICD-10-CM

## 2022-12-19 DIAGNOSIS — E66.9 OBESITY (BMI 30-39.9): Primary | ICD-10-CM

## 2022-12-19 PROCEDURE — 99213 OFFICE O/P EST LOW 20 MIN: CPT | Performed by: NURSE PRACTITIONER

## 2022-12-19 PROCEDURE — G8427 DOCREV CUR MEDS BY ELIG CLIN: HCPCS | Performed by: NURSE PRACTITIONER

## 2022-12-19 PROCEDURE — G8417 CALC BMI ABV UP PARAM F/U: HCPCS | Performed by: NURSE PRACTITIONER

## 2022-12-19 PROCEDURE — 1036F TOBACCO NON-USER: CPT | Performed by: NURSE PRACTITIONER

## 2022-12-19 PROCEDURE — G8484 FLU IMMUNIZE NO ADMIN: HCPCS | Performed by: NURSE PRACTITIONER

## 2022-12-19 RX ORDER — PHENTERMINE HYDROCHLORIDE 37.5 MG/1
37.5 CAPSULE ORAL EVERY MORNING
COMMUNITY

## 2022-12-19 RX ORDER — PHENTERMINE HYDROCHLORIDE 37.5 MG/1
37.5 TABLET ORAL
Qty: 30 TABLET | Refills: 0 | Status: SHIPPED | OUTPATIENT
Start: 2022-12-19 | End: 2023-01-18

## 2022-12-19 ASSESSMENT — PATIENT HEALTH QUESTIONNAIRE - PHQ9
SUM OF ALL RESPONSES TO PHQ QUESTIONS 1-9: 0
2. FEELING DOWN, DEPRESSED OR HOPELESS: 0
SUM OF ALL RESPONSES TO PHQ QUESTIONS 1-9: 0
SUM OF ALL RESPONSES TO PHQ9 QUESTIONS 1 & 2: 0
1. LITTLE INTEREST OR PLEASURE IN DOING THINGS: 0

## 2022-12-19 NOTE — PROGRESS NOTES
Chief Complaint   Patient presents with    Weight Management     F/U- Medication wm visit, Adipex #3. Has bar cov. SUBJECTIVE:    HPI: Patient is here with complaints of: Monthly Adipex visit. Obesity with a BMI of Body mass index is 38.94 kg/m². .    Current weight: 244 pounds    Weight change since last visit: -5.9 pounds (if pt failed to lose weight, cannot remain on medication). Month 3 of 3 of being on Adipex. Any new absolute contraindications (glaucoma, drug abuse, CAD, uncontrolled HTN, arrhythmias, stroke, CHF, hyperthyroidism, MAOI use, pregnant or breastfeeding) to being on medication: DENIES     Pt complains of the following side effects: DENIES    Current dietary measures: not tracking calories    Current exercise measures: walks frequently    I have reviewed the patient's(pertinent information to this visit) medical history, family history(scanned in  the [address] [place] [address] under \"patient questioner\"), social history and review of systems with the patient today in the office. Past Surgical History:   Procedure Laterality Date    ABDOMEN SURGERY      TUBAL LIGATION         Past Medical History:   Diagnosis Date    Helicobacter pylori (H. pylori) 8/27/12    Migraines     Reflux        Family History   Problem Relation Age of Onset    Depression Mother     Stroke Father     Alcohol Abuse Maternal Uncle     Cancer Maternal Uncle         throat    Diabetes Maternal Grandmother     High Blood Pressure Maternal Grandmother     Mental Retardation Maternal Cousin     Allergy (Severe) Other     Learning Disabilities Other         son    Breast Cancer Neg Hx     Ovarian Cancer Neg Hx        Social History     Socioeconomic History    Marital status:      Spouse name: Not on file    Number of children: Not on file    Years of education: Not on file    Highest education level: Not on file   Occupational History    Not on file   Tobacco Use    Smoking status: Never     Passive exposure:  Yes Smokeless tobacco: Never    Tobacco comments:      smokes in house    Vaping Use    Vaping Use: Never used   Substance and Sexual Activity    Alcohol use: Yes     Comment: rarely    Drug use: No    Sexual activity: Yes     Partners: Male   Other Topics Concern    Not on file   Social History Narrative    Not on file     Social Determinants of Health     Financial Resource Strain: Not on file   Food Insecurity: Not on file   Transportation Needs: Not on file   Physical Activity: Not on file   Stress: Not on file   Social Connections: Not on file   Intimate Partner Violence: Not on file   Housing Stability: Not on file       Current Outpatient Medications   Medication Sig Dispense Refill    phentermine (ADIPEX-P) 37.5 MG capsule Take 37.5 mg by mouth every morning. phentermine (ADIPEX-P) 37.5 MG tablet Take 1 tablet by mouth every morning (before breakfast) for 30 days.  BMI 44. 30 tablet 0    naproxen (NAPROSYN) 500 MG tablet Take 1 tablet by mouth 2 times daily 60 tablet 0    cyclobenzaprine (FLEXERIL) 10 MG tablet Take 1 tablet by mouth 3 times daily as needed for Muscle spasms 15 tablet 0    ibuprofen (ADVIL;MOTRIN) 800 MG tablet Take 1 tablet by mouth 2 times daily as needed for Pain 30 tablet 0    dicyclomine (BENTYL) 20 MG tablet Take 1 tablet by mouth 3 times daily as needed (Pain) 21 tablet 3    Cetirizine HCl 10 MG CAPS Take 10 mg by mouth daily      albuterol sulfate HFA (PROVENTIL HFA) 108 (90 Base) MCG/ACT inhaler Inhale 2 puffs into the lungs every 6 hours as needed for Wheezing or Shortness of Breath 1 Inhaler 0    ondansetron (ZOFRAN ODT) 4 MG disintegrating tablet Take 1 tablet by mouth every 8 hours as needed for Nausea 15 tablet 0    SUMAtriptan (IMITREX) 25 MG tablet once as needed  0    Emollient (DERMAPHOR) OINT ointment   1    fluticasone (FLONASE) 50 MCG/ACT nasal spray   2    traZODone (DESYREL) 50 MG tablet as needed  2    fexofenadine (ALLEGRA) 180 MG tablet Take 180 mg by mouth daily      ranitidine (ZANTAC) 150 MG tablet Take 150 mg by mouth 2 times daily (Patient not taking: No sig reported)       No current facility-administered medications for this visit. No Known Allergies    Review of Systems:         Review of Systems   All other systems reviewed and are negative. OBJECTIVE:  Physical Exam:    /76 (Site: Left Upper Arm, Position: Sitting, Cuff Size: Medium Adult)   Pulse 75   Ht 5' 6.5\" (1.689 m)   Wt 244 lb 14.4 oz (111.1 kg)   SpO2 100%   BMI 38.94 kg/m²      Physical Exam  Constitutional:       Appearance: Normal appearance. She is obese. HENT:      Head: Normocephalic. Nose: Nose normal.      Mouth/Throat:      Pharynx: Oropharynx is clear. Eyes:      Conjunctiva/sclera: Conjunctivae normal.      Pupils: Pupils are equal, round, and reactive to light. Cardiovascular:      Rate and Rhythm: Normal rate. Pulses: Normal pulses. Pulmonary:      Effort: Pulmonary effort is normal.   Abdominal:      Palpations: Abdomen is soft. Musculoskeletal:         General: Normal range of motion. Cervical back: Normal range of motion. Skin:     General: Skin is warm and dry. Capillary Refill: Capillary refill takes less than 2 seconds. Neurological:      General: No focal deficit present. Mental Status: She is alert and oriented to person, place, and time. Psychiatric:         Mood and Affect: Mood normal.         Behavior: Behavior normal.         ASSESSMENT:  1. Obesity (BMI 30-39.9)  - Continue tracking calories; about 1492-6617 daily  - Continue 64 oz water daily  - Increase activity as able    2. Medication management  - Doing well; down 5.9 pounds since last visit  - Denies any side effects  - RX refill sent for last month of Adipex  - Wean off last 4 days of therapy- take 1/2 dose every other day until completed  - Possible transition to Qsymia next month  - RTC one month      PLAN:    -Continue Adipex.  [place] REFILLED.    -BMI is over 30, or over 27 with weight-related risk factors.     -Pt demonstrated weight loss since last visit.    -Pt aware Adipex can only be used short term (3 months). -Pt aware no breaks in treatment allowed or must be off for 6 months.    -Pt aware that weight must be lost at each visit or medication will be discontinued.     -Pt is aware that in order to be successful, must combine Adipex with appropriate diet and exercise plan. -Pt aware must continue to meet monthly in person while on this medication. F/u in one month. -Check [place] [facility] Rx Reporting System. Any concerns: NONE Reported     -Current birth control measures include: tubal ligation    -If elderly or renal impairment, will use lower dose (15 mg daily) and avoid all together if GFR is less than 15. N/A      No orders of the defined types were placed in this encounter. Orders Placed This Encounter   Medications    phentermine (ADIPEX-P) 37.5 MG tablet     Sig: Take 1 tablet by mouth every morning (before breakfast) for 30 days. BMI 44. Dispense:  30 tablet     Refill:  0          Follow Up:  Return in about 1 month (around 1/19/2023).       JACKIE Beltran - CNP

## 2023-01-17 ENCOUNTER — OFFICE VISIT (OUTPATIENT)
Dept: BARIATRICS/WEIGHT MGMT | Age: 48
End: 2023-01-17
Payer: COMMERCIAL

## 2023-01-17 VITALS
OXYGEN SATURATION: 100 % | DIASTOLIC BLOOD PRESSURE: 76 MMHG | HEART RATE: 112 BPM | SYSTOLIC BLOOD PRESSURE: 122 MMHG | HEIGHT: 67 IN | BODY MASS INDEX: 37.87 KG/M2 | WEIGHT: 241.3 LBS

## 2023-01-17 DIAGNOSIS — E66.9 OBESITY (BMI 30-39.9): Primary | ICD-10-CM

## 2023-01-17 DIAGNOSIS — Z79.899 MEDICATION MANAGEMENT: ICD-10-CM

## 2023-01-17 PROCEDURE — G8484 FLU IMMUNIZE NO ADMIN: HCPCS | Performed by: NURSE PRACTITIONER

## 2023-01-17 PROCEDURE — G8427 DOCREV CUR MEDS BY ELIG CLIN: HCPCS | Performed by: NURSE PRACTITIONER

## 2023-01-17 PROCEDURE — G8417 CALC BMI ABV UP PARAM F/U: HCPCS | Performed by: NURSE PRACTITIONER

## 2023-01-17 PROCEDURE — 1036F TOBACCO NON-USER: CPT | Performed by: NURSE PRACTITIONER

## 2023-01-17 PROCEDURE — 99213 OFFICE O/P EST LOW 20 MIN: CPT | Performed by: NURSE PRACTITIONER

## 2023-01-17 ASSESSMENT — PATIENT HEALTH QUESTIONNAIRE - PHQ9
SUM OF ALL RESPONSES TO PHQ QUESTIONS 1-9: 0
1. LITTLE INTEREST OR PLEASURE IN DOING THINGS: 0
SUM OF ALL RESPONSES TO PHQ QUESTIONS 1-9: 0
SUM OF ALL RESPONSES TO PHQ QUESTIONS 1-9: 0
2. FEELING DOWN, DEPRESSED OR HOPELESS: 0
SUM OF ALL RESPONSES TO PHQ QUESTIONS 1-9: 0
SUM OF ALL RESPONSES TO PHQ9 QUESTIONS 1 & 2: 0

## 2023-01-17 NOTE — PROGRESS NOTES
Chief Complaint   Patient presents with    Weight Management     F/U- Medication wm visit, Adipex completed. Possible trans to Qsymia. SUBJECTIVE:    HPI: Patient is here with complaints of: NEW monthly Qsymia visit. Obesity with a BMI of Body mass index is 38.36 kg/m². .    Current weight: 241 pounds    Weight change since last visit: -3.6 pounds    Any new absolute contraindications (drug class allergy, pregnancy or breastfeeding, glaucoma, hyperthyroidism, current use of MAOIs, drug abuse, CAD, uncontrolled HTN, arrhythmias, stroke, CHF) to being on medication: DENIES    Pt complains of the following side effects: DENIES    Any new mood disorders or suicidal thoughts/behaviors: DENIES    Current dietary measures: eating healthier; not tracking calories    Current exercise measures: just started with squats and things like that 25 mins daily    I have reviewed the patient's(pertinent information to this visit) medical history, family history(scanned in  the [address] [place] [address] under \"patient questioner\"), social history and review of systems with the patient today in the office.           Past Surgical History:   Procedure Laterality Date    ABDOMEN SURGERY      TUBAL LIGATION         Past Medical History:   Diagnosis Date    Helicobacter pylori (H. pylori) 8/27/12    Migraines     Reflux        Family History   Problem Relation Age of Onset    Depression Mother     Stroke Father     Alcohol Abuse Maternal Uncle     Cancer Maternal Uncle         throat    Diabetes Maternal Grandmother     High Blood Pressure Maternal Grandmother     Mental Retardation Maternal Cousin     Allergy (Severe) Other     Learning Disabilities Other         son    Breast Cancer Neg Hx     Ovarian Cancer Neg Hx        Social History     Socioeconomic History    Marital status:      Spouse name: Not on file    Number of children: Not on file    Years of education: Not on file    Highest education level: Not on file   Occupational History    Not on file   Tobacco Use    Smoking status: Never     Passive exposure: Yes    Smokeless tobacco: Never    Tobacco comments:      smokes in house    Vaping Use    Vaping Use: Never used   Substance and Sexual Activity    Alcohol use: Yes     Comment: rarely    Drug use: No    Sexual activity: Yes     Partners: Male   Other Topics Concern    Not on file   Social History Narrative    Not on file     Social Determinants of Health     Financial Resource Strain: Not on file   Food Insecurity: Not on file   Transportation Needs: Not on file   Physical Activity: Not on file   Stress: Not on file   Social Connections: Not on file   Intimate Partner Violence: Not on file   Housing Stability: Not on file       Current Outpatient Medications   Medication Sig Dispense Refill    Phentermine-Topiramate 3.75-23 MG CP24 Take 1 tablet by mouth daily for 14 days. Take in am. Max Daily Amount: 1 tablet 14 capsule 0    Phentermine-Topiramate 7.5-46 MG CP24 Take 1 tablet by mouth daily for 30 days.  Take in am. Max Daily Amount: 1 tablet 30 capsule 0    naproxen (NAPROSYN) 500 MG tablet Take 1 tablet by mouth 2 times daily 60 tablet 0    cyclobenzaprine (FLEXERIL) 10 MG tablet Take 1 tablet by mouth 3 times daily as needed for Muscle spasms 15 tablet 0    ibuprofen (ADVIL;MOTRIN) 800 MG tablet Take 1 tablet by mouth 2 times daily as needed for Pain 30 tablet 0    dicyclomine (BENTYL) 20 MG tablet Take 1 tablet by mouth 3 times daily as needed (Pain) 21 tablet 3    Cetirizine HCl 10 MG CAPS Take 10 mg by mouth daily      albuterol sulfate HFA (PROVENTIL HFA) 108 (90 Base) MCG/ACT inhaler Inhale 2 puffs into the lungs every 6 hours as needed for Wheezing or Shortness of Breath 1 Inhaler 0    ondansetron (ZOFRAN ODT) 4 MG disintegrating tablet Take 1 tablet by mouth every 8 hours as needed for Nausea 15 tablet 0    SUMAtriptan (IMITREX) 25 MG tablet once as needed  0    Emollient (DERMAPHOR) OINT ointment   1 fluticasone (FLONASE) 50 MCG/ACT nasal spray   2    traZODone (DESYREL) 50 MG tablet as needed  2    fexofenadine (ALLEGRA) 180 MG tablet Take 180 mg by mouth daily      phentermine 37.5 MG capsule Take 37.5 mg by mouth every morning. (Patient not taking: Reported on 1/17/2023)      phentermine (ADIPEX-P) 37.5 MG tablet Take 1 tablet by mouth every morning (before breakfast) for 30 days. BMI 44. (Patient not taking: Reported on 1/17/2023) 30 tablet 0    ranitidine (ZANTAC) 150 MG tablet Take 150 mg by mouth 2 times daily (Patient not taking: No sig reported)       No current facility-administered medications for this visit. No Known Allergies    Review of Systems:         Review of Systems   All other systems reviewed and are negative. OBJECTIVE:  Physical Exam:    /76 (Site: Left Upper Arm, Position: Sitting, Cuff Size: Large Adult)   Pulse (!) 112   Ht 5' 6.5\" (1.689 m)   Wt 241 lb 4.8 oz (109.5 kg)   SpO2 100%   BMI 38.36 kg/m²      Physical Exam  Constitutional:       Appearance: Normal appearance. She is obese. HENT:      Head: Normocephalic. Nose: Nose normal.      Mouth/Throat:      Pharynx: Oropharynx is clear. Eyes:      Conjunctiva/sclera: Conjunctivae normal.      Pupils: Pupils are equal, round, and reactive to light. Cardiovascular:      Rate and Rhythm: Normal rate. Pulses: Normal pulses. Pulmonary:      Effort: Pulmonary effort is normal.   Abdominal:      Palpations: Abdomen is soft. Musculoskeletal:         General: Normal range of motion. Cervical back: Normal range of motion. Skin:     General: Skin is warm and dry. Capillary Refill: Capillary refill takes less than 2 seconds. Neurological:      General: No focal deficit present. Mental Status: She is alert and oriented to person, place, and time.    Psychiatric:         Mood and Affect: Mood normal.         Behavior: Behavior normal.         ASSESSMENT:  1. Obesity (BMI 30-39.9)  - Continue tracking calories; about 8173-0096 daily  - Continue 64 oz water daily  - Increase activity as tolerated    2. Medication management  - Adipex therapy completed without side effects  - Bmi now 38.36  - Will transition to Qsymia with step dosing  - RX sent to pharmacy  - RTC one month      PLAN:    -BMI is over 30, or over 27 with weight-related risk factors. Pt demonstrated weight loss since last visit.    -Start Qsymia at  7.5mg/46mg dose- see initial dosing schedule and guidelines below:    -Dosing schedule as follows per  recommendation: Initial dose will be 3.75 mg/23 mg PO qDay for 14 days and then increased to 7.5 mg/46 mg PO qDay for 12 weeks at which point the pt's weight will be evaluated. If the pt has not lost at least 3% of their baseline weight at that point  then medication will be discontinued or increased to 11.25 mg/69 mg PO qDay for 14 days, followed by dosing 15 mg/92 mg PO qDay for 12 weeks of treatment. If pt has not lost 5% of baseline weight after 15 mg/92 mg dose, then medication will be gradually tapered and discontinued. -RX sent - take as directed    -Call for any side effects or concerns    -Pt is aware that in order to be successful, must combine Qsymia with appropriate diet and exercise plan. -Pt aware must continue to meet monthly in person while on this medication. F/u in one month. -CMP must be checked every three months while on this medication.    -Current birth control measures include: tubal ligation    -If elderly, renal impairment, or hepatic impairment, will use lower dose (7.5 mg/46 mg PO qDay) and avoid all together if severe hepatic impairment (Child-Dutton score 10-15). N/A      No orders of the defined types were placed in this encounter. Orders Placed This Encounter   Medications    Phentermine-Topiramate 3.75-23 MG CP24     Sig: Take 1 tablet by mouth daily for 14 days.  Take in am. Max Daily Amount: 1 tablet     Dispense:  14 capsule Refill:  0    Phentermine-Topiramate 7.5-46 MG CP24     Sig: Take 1 tablet by mouth daily for 30 days. Take in am. Max Daily Amount: 1 tablet     Dispense:  30 capsule     Refill:  0          Follow Up:  Return in about 1 month (around 2/17/2023).       JACKIE Bal - CNP

## 2023-04-26 ENCOUNTER — OFFICE VISIT (OUTPATIENT)
Dept: BARIATRICS/WEIGHT MGMT | Age: 48
End: 2023-04-26

## 2023-04-26 VITALS — HEIGHT: 67 IN | WEIGHT: 237.6 LBS | BODY MASS INDEX: 37.29 KG/M2

## 2023-04-26 DIAGNOSIS — E66.9 OBESITY (BMI 30-39.9): Primary | ICD-10-CM

## 2023-04-26 PROCEDURE — 99999 PR OFFICE/OUTPT VISIT,PROCEDURE ONLY: CPT | Performed by: SURGERY

## 2023-04-26 NOTE — PROGRESS NOTES
Outpatient Nutrition Counseling - Non-Surgical Weight Loss Program    REASON FOR VISIT: Non-Surgical Program    Chief Complaint:    Chief Complaint   Patient presents with    Weight Management       SUBJECTIVE:  Pt here for initial nutrition consult in non-surgical weight loss program. Her challenges include skipping meals and often high fat food choices. The patient is a 51 y.o. female being seen for obesity, enrolled in Non-Surgical Weight Loss Program; [facility], Height: 5' 6.5\" (168.9 cm), Weight: 237 lb 9.6 oz (107.8 kg), Current Body mass index is 37.78 kg/m². patient's PCP is JACKIE Ramos CNP    Comorbid Conditions:  Significant diseases affecting this patient are   Past Medical History:   Diagnosis Date    Helicobacter pylori (H. pylori) 8/27/12    Migraines     Reflux    . Review of Systems - Review of Systems  Otherwise per HPI. Allergies:  No Known Allergies    Past Surgical History:  Past Surgical History:   Procedure Laterality Date    ABDOMEN SURGERY      TUBAL LIGATION         Family History:  Family History   Problem Relation Age of Onset    Depression Mother     Stroke Father     Alcohol Abuse Maternal Uncle     Cancer Maternal Uncle         throat    Diabetes Maternal Grandmother     High Blood Pressure Maternal Grandmother     Mental Retardation Maternal Cousin     Allergy (Severe) Other     Learning Disabilities Other         son    Breast Cancer Neg Hx     Ovarian Cancer Neg Hx        Social History:  Social History     Socioeconomic History    Marital status:      Spouse name: Not on file    Number of children: Not on file    Years of education: Not on file    Highest education level: Not on file   Occupational History    Not on file   Tobacco Use    Smoking status: Never     Passive exposure: Yes    Smokeless tobacco: Never    Tobacco comments:      smokes in house    Vaping Use    Vaping Use: Never used   Substance and Sexual Activity    Alcohol use:  Yes

## 2023-04-28 ENCOUNTER — OFFICE VISIT (OUTPATIENT)
Dept: BARIATRICS/WEIGHT MGMT | Age: 48
End: 2023-04-28

## 2023-04-28 VITALS — BODY MASS INDEX: 37.26 KG/M2 | WEIGHT: 237.4 LBS | HEIGHT: 67 IN

## 2023-04-28 DIAGNOSIS — E66.9 OBESITY (BMI 30-39.9): Primary | ICD-10-CM

## 2023-04-28 PROCEDURE — 99999 PR OFFICE/OUTPT VISIT,PROCEDURE ONLY: CPT | Performed by: SURGERY

## 2023-04-28 NOTE — PROGRESS NOTES
OutpatientNutrition Counseling - Non-Surgical Weight Loss Program    REASON FOR VISIT:    Chief Complaint:    Chief Complaint   Patient presents with    Weight Management     Weigh in         OBJECTIVE:  Physical Exam   Ht 5' 6.5\" (1.689 m)   Wt 237 lb 6.4 oz (107.7 kg)   BMI 37.74 kg/m²                Patient lost 0.2lbs

## 2023-06-09 ENCOUNTER — OFFICE VISIT (OUTPATIENT)
Dept: BARIATRICS/WEIGHT MGMT | Age: 48
End: 2023-06-09

## 2023-06-09 VITALS — HEIGHT: 67 IN | WEIGHT: 239.4 LBS | BODY MASS INDEX: 37.57 KG/M2

## 2023-06-09 DIAGNOSIS — E66.01 MORBID OBESITY WITH BMI OF 40.0-44.9, ADULT (HCC): Primary | ICD-10-CM

## 2023-06-09 PROCEDURE — 99999 PR OFFICE/OUTPT VISIT,PROCEDURE ONLY: CPT | Performed by: SURGERY

## 2023-06-09 NOTE — PROGRESS NOTES
OutpatientNutrition Counseling - Non-Surgical Weight Loss Program    REASON FOR VISIT:    Chief Complaint:    Chief Complaint   Patient presents with    Weight Management     Weigh in          OBJECTIVE:  Physical Exam   Ht 5' 6.5\" (1.689 m)   Wt 239 lb 6.4 oz (108.6 kg)   BMI 38.06 kg/m²              Pt gained 2 lbs

## 2023-06-23 ENCOUNTER — OFFICE VISIT (OUTPATIENT)
Dept: BARIATRICS/WEIGHT MGMT | Age: 48
End: 2023-06-23

## 2023-06-23 VITALS — HEIGHT: 67 IN | WEIGHT: 233.8 LBS | BODY MASS INDEX: 36.7 KG/M2

## 2023-06-23 DIAGNOSIS — E66.01 MORBID OBESITY WITH BMI OF 40.0-44.9, ADULT (HCC): Primary | ICD-10-CM

## 2023-06-23 NOTE — PROGRESS NOTES
OutpatientNutrition Counseling - Non-Surgical Weight Loss Program    REASON FOR VISIT:    Chief Complaint:    Chief Complaint   Patient presents with    Weight Management         OBJECTIVE:  Physical Exam   Ht 5' 6.5\" (1.689 m)   Wt 233 lb 12.8 oz (106.1 kg)   BMI 37.17 kg/m²                Patient lost 1.6lbs

## 2023-06-30 ENCOUNTER — OFFICE VISIT (OUTPATIENT)
Dept: BARIATRICS/WEIGHT MGMT | Age: 48
End: 2023-06-30

## 2023-06-30 VITALS — BODY MASS INDEX: 36.6 KG/M2 | WEIGHT: 233.2 LBS | HEIGHT: 67 IN

## 2023-06-30 DIAGNOSIS — E66.01 MORBID OBESITY WITH BMI OF 40.0-44.9, ADULT (HCC): Primary | ICD-10-CM

## 2023-07-14 ENCOUNTER — OFFICE VISIT (OUTPATIENT)
Dept: BARIATRICS/WEIGHT MGMT | Age: 48
End: 2023-07-14

## 2023-07-14 VITALS — HEIGHT: 66 IN | WEIGHT: 234.2 LBS | BODY MASS INDEX: 37.64 KG/M2

## 2023-07-14 DIAGNOSIS — E66.01 MORBID OBESITY WITH BMI OF 40.0-44.9, ADULT (HCC): Primary | ICD-10-CM

## 2023-07-21 ENCOUNTER — APPOINTMENT (OUTPATIENT)
Dept: CT IMAGING | Age: 48
End: 2023-07-21
Payer: COMMERCIAL

## 2023-07-21 ENCOUNTER — APPOINTMENT (OUTPATIENT)
Dept: GENERAL RADIOLOGY | Age: 48
End: 2023-07-21
Payer: COMMERCIAL

## 2023-07-21 ENCOUNTER — HOSPITAL ENCOUNTER (EMERGENCY)
Age: 48
Discharge: HOME | End: 2023-07-21
Payer: COMMERCIAL

## 2023-07-21 VITALS
HEART RATE: 102 BPM | RESPIRATION RATE: 23 BRPM | DIASTOLIC BLOOD PRESSURE: 78 MMHG | OXYGEN SATURATION: 100 % | TEMPERATURE: 99.3 F | SYSTOLIC BLOOD PRESSURE: 130 MMHG

## 2023-07-21 DIAGNOSIS — U07.1 COVID-19: Primary | ICD-10-CM

## 2023-07-21 DIAGNOSIS — N83.201 BILATERAL OVARIAN CYSTS: ICD-10-CM

## 2023-07-21 DIAGNOSIS — N83.202 BILATERAL OVARIAN CYSTS: ICD-10-CM

## 2023-07-21 DIAGNOSIS — E86.0 DEHYDRATION: ICD-10-CM

## 2023-07-21 LAB
ALBUMIN SERPL-MCNC: 3.9 GM/DL (ref 3.4–5)
ALP BLD-CCNC: 67 IU/L (ref 40–129)
ALT SERPL-CCNC: 14 U/L (ref 10–40)
ANION GAP SERPL CALCULATED.3IONS-SCNC: 10 MMOL/L (ref 4–16)
AST SERPL-CCNC: 24 IU/L (ref 15–37)
BACTERIA: NEGATIVE /HPF
BASOPHILS ABSOLUTE: 0 K/CU MM
BASOPHILS RELATIVE PERCENT: 0.5 % (ref 0–1)
BILIRUB SERPL-MCNC: 0.1 MG/DL (ref 0–1)
BILIRUBIN URINE: NEGATIVE MG/DL
BLOOD, URINE: ABNORMAL
BUN SERPL-MCNC: 5 MG/DL (ref 6–23)
CALCIUM SERPL-MCNC: 9 MG/DL (ref 8.3–10.6)
CHLORIDE BLD-SCNC: 100 MMOL/L (ref 99–110)
CLARITY: CLEAR
CO2: 23 MMOL/L (ref 21–32)
COLOR: YELLOW
CREAT SERPL-MCNC: 0.7 MG/DL (ref 0.6–1.1)
DIFFERENTIAL TYPE: ABNORMAL
EOSINOPHILS ABSOLUTE: 0.1 K/CU MM
EOSINOPHILS RELATIVE PERCENT: 1 % (ref 0–3)
GFR SERPL CREATININE-BSD FRML MDRD: >60 ML/MIN/1.73M2
GLUCOSE SERPL-MCNC: 117 MG/DL (ref 70–99)
GLUCOSE, URINE: NEGATIVE MG/DL
HCG QUALITATIVE: NEGATIVE
HCT VFR BLD CALC: 36.2 % (ref 37–47)
HEMOGLOBIN: 11.5 GM/DL (ref 12.5–16)
IMMATURE NEUTROPHIL %: 0.2 % (ref 0–0.43)
INFLUENZA A ANTIGEN: NOT DETECTED
INFLUENZA B ANTIGEN: NOT DETECTED
KETONES, URINE: NEGATIVE MG/DL
LEUKOCYTE ESTERASE, URINE: NEGATIVE
LIPASE: 18 IU/L (ref 13–60)
LYMPHOCYTES ABSOLUTE: 1.1 K/CU MM
LYMPHOCYTES RELATIVE PERCENT: 18.4 % (ref 24–44)
MCH RBC QN AUTO: 30.4 PG (ref 27–31)
MCHC RBC AUTO-ENTMCNC: 31.8 % (ref 32–36)
MCV RBC AUTO: 95.8 FL (ref 78–100)
MONOCYTES ABSOLUTE: 0.3 K/CU MM
MONOCYTES RELATIVE PERCENT: 5.6 % (ref 0–4)
MUCUS: ABNORMAL HPF
NITRITE URINE, QUANTITATIVE: NEGATIVE
NUCLEATED RBC %: 0 %
PDW BLD-RTO: 13.2 % (ref 11.7–14.9)
PH, URINE: 8.5 (ref 5–8)
PLATELET # BLD: 241 K/CU MM (ref 140–440)
PMV BLD AUTO: 10.9 FL (ref 7.5–11.1)
POTASSIUM SERPL-SCNC: 3.7 MMOL/L (ref 3.5–5.1)
PROTEIN UA: NEGATIVE MG/DL
RBC # BLD: 3.78 M/CU MM (ref 4.2–5.4)
RBC URINE: 11 /HPF (ref 0–6)
SARS-COV-2 RDRP RESP QL NAA+PROBE: DETECTED
SEGMENTED NEUTROPHILS ABSOLUTE COUNT: 4.5 K/CU MM
SEGMENTED NEUTROPHILS RELATIVE PERCENT: 74.3 % (ref 36–66)
SODIUM BLD-SCNC: 133 MMOL/L (ref 135–145)
SOURCE: ABNORMAL
SPECIFIC GRAVITY UA: 1.02 (ref 1–1.03)
SQUAMOUS EPITHELIAL: 1 /HPF
TOTAL IMMATURE NEUTOROPHIL: 0.01 K/CU MM
TOTAL NUCLEATED RBC: 0 K/CU MM
TOTAL PROTEIN: 6.8 GM/DL (ref 6.4–8.2)
TRICHOMONAS: ABNORMAL /HPF
UROBILINOGEN, URINE: 0.2 MG/DL (ref 0.2–1)
WBC # BLD: 6.1 K/CU MM (ref 4–10.5)
WBC UA: <1 /HPF (ref 0–5)

## 2023-07-21 PROCEDURE — 81001 URINALYSIS AUTO W/SCOPE: CPT

## 2023-07-21 PROCEDURE — 87635 SARS-COV-2 COVID-19 AMP PRB: CPT

## 2023-07-21 PROCEDURE — 2580000003 HC RX 258: Performed by: PHYSICIAN ASSISTANT

## 2023-07-21 PROCEDURE — 80053 COMPREHEN METABOLIC PANEL: CPT

## 2023-07-21 PROCEDURE — 6370000000 HC RX 637 (ALT 250 FOR IP): Performed by: PHYSICIAN ASSISTANT

## 2023-07-21 PROCEDURE — 84703 CHORIONIC GONADOTROPIN ASSAY: CPT

## 2023-07-21 PROCEDURE — 74177 CT ABD & PELVIS W/CONTRAST: CPT

## 2023-07-21 PROCEDURE — 85025 COMPLETE CBC W/AUTO DIFF WBC: CPT

## 2023-07-21 PROCEDURE — 87502 INFLUENZA DNA AMP PROBE: CPT

## 2023-07-21 PROCEDURE — 6360000004 HC RX CONTRAST MEDICATION: Performed by: PHYSICIAN ASSISTANT

## 2023-07-21 PROCEDURE — 96361 HYDRATE IV INFUSION ADD-ON: CPT

## 2023-07-21 PROCEDURE — 83690 ASSAY OF LIPASE: CPT

## 2023-07-21 PROCEDURE — 71046 X-RAY EXAM CHEST 2 VIEWS: CPT

## 2023-07-21 PROCEDURE — 99285 EMERGENCY DEPT VISIT HI MDM: CPT

## 2023-07-21 PROCEDURE — 96360 HYDRATION IV INFUSION INIT: CPT

## 2023-07-21 RX ORDER — SODIUM CHLORIDE 0.9 % (FLUSH) 0.9 %
5-40 SYRINGE (ML) INJECTION 2 TIMES DAILY
Status: DISCONTINUED | OUTPATIENT
Start: 2023-07-21 | End: 2023-07-21 | Stop reason: HOSPADM

## 2023-07-21 RX ORDER — 0.9 % SODIUM CHLORIDE 0.9 %
1000 INTRAVENOUS SOLUTION INTRAVENOUS ONCE
Status: COMPLETED | OUTPATIENT
Start: 2023-07-21 | End: 2023-07-21

## 2023-07-21 RX ORDER — ACETAMINOPHEN 500 MG
1000 TABLET ORAL ONCE
Status: COMPLETED | OUTPATIENT
Start: 2023-07-21 | End: 2023-07-21

## 2023-07-21 RX ADMIN — SODIUM CHLORIDE 1000 ML: 9 INJECTION, SOLUTION INTRAVENOUS at 10:55

## 2023-07-21 RX ADMIN — ACETAMINOPHEN 1000 MG: 500 TABLET ORAL at 13:11

## 2023-07-21 RX ADMIN — IOPAMIDOL 80 ML: 755 INJECTION, SOLUTION INTRAVENOUS at 13:31

## 2023-07-21 NOTE — ED PROVIDER NOTES
**ADVANCED PRACTICE PROVIDER, I HAVE EVALUATED THIS PATIENT**        [facility] ENCOUNTER      Pt Name: Manfred Sue  VNX:[id number]  [address] [facility] 1975  Date of evaluation: 7/21/2023  Provider: Manish Dominique PA-C  Note Started: 6:05 PM EDT 7/21/2023        Chief Complaint:    Chief Complaint   Patient presents with    Cough    Nausea    Emesis    Fever         Nursing Notes, Past Medical Hx, Past Surgical Hx, Social Hx, Allergies, and Family Hx were all reviewed and agreed with or any disagreements were addressed in the HPI.    HPI: (Location, Duration, Timing, Severity, Quality, Assoc Sx, Context, Modifying factors)    History From: The patient  Limitations to history : None    Social Determinants Significantly Affecting Health : None    Chief Complaint of cough, nausea, vomiting and fever    This is a  51 y.o. female who presents to the emergency department, the patient states that she has cough, chills, nausea, vomiting, sweats, abdominal pain. Nothing seems to make her feel completely better. This has been going on for the past 48 hours.   She denies any chest pain, she denies any diarrhea or constipation, she denies any increased frequency or urgency of urination or burning with urination    PastMedical/Surgical History:      Diagnosis Date    Helicobacter pylori (H. pylori) 8/27/12    Migraines     Reflux          Procedure Laterality Date    ABDOMEN SURGERY      TUBAL LIGATION         Medications:  Discharge Medication List as of 7/21/2023  3:33 PM        CONTINUE these medications which have NOT CHANGED    Details   naproxen (NAPROSYN) 500 MG tablet Take 1 tablet by mouth 2 times daily, Disp-60 tablet, R-0Normal      cyclobenzaprine (FLEXERIL) 10 MG tablet Take 1 tablet by mouth 3 times daily as needed for Muscle spasms, Disp-15 tablet, R-0Normal      ibuprofen (ADVIL;MOTRIN) 800 MG tablet Take 1 tablet by mouth 2 times fevers and chills, differential diagnosis includes influenza, sepsis, UTI, pneumonia. Patient had signs and symptoms consistent with COVID-19, however her abdominal pain I do believe is related to her ovarian cysts. She did not have pain out of proportion to suggest ovarian torsion at this time. Patient responded to fluids, she defervesced, we will encourage close follow-up                  The patient tolerated their visit well. I evaluated the patient. The physician was available for consultation as needed. The patient and / or the family were informed of the results of any tests, a time was given to answer questions, a plan was proposed and they agreed with plan. I am the Primary Clinician of Record. CLINICAL IMPRESSION:  1. COVID-19    2. Dehydration    3. Bilateral ovarian cysts        DISPOSITION Decision To Discharge 07/21/2023 02:51:17 PM      PATIENT REFERRED TO:  Nick Crespo, APRN - CNP  [address] [address]  [phone]    Call in 3 days  For a recheck in 3-7 days      DISCHARGE MEDICATIONS:  Discharge Medication List as of 7/21/2023  3:33 PM        START taking these medications    Details   nirmatrelvir/ritonavir 300/100 (PAXLOVID) 20 x 150 MG & 10 x 100MG TBPK Take 3 tablets (two 150 mg nirmatrelvir and one 100 mg ritonavir tablets) by mouth every 12 hours for 5 days. , Disp-30 tablet, R-0Normal             DISCONTINUED MEDICATIONS:  Discharge Medication List as of 7/21/2023  3:33 PM        STOP taking these medications       phentermine 37.5 MG capsule Comments:   Reason for Stopping:         ranitidine (ZANTAC) 150 MG tablet Comments:   Reason for Stopping:                      (Please note the MDM and HPI sections of this note were completed with a voice recognition program.  Efforts were made to edit the dictations but occasionally words are mis-transcribed.)    Electronically signed, Madonna Espinoza PA-C,          Madonna Espinoza PA-C  07/21/23 [id number]

## 2023-07-21 NOTE — ED TRIAGE NOTES
Pt complaining of Na/Vo, fever, chills, and abd pain starting four days ago. Pt has not taken any otc medications today.

## 2023-07-21 NOTE — ED NOTES
CT staff notified this RN that IV was kinked and could not be advanced and does not work.       Leighton Rizzo, BERNARDA  07/21/23 2301

## 2023-09-21 ENCOUNTER — HOSPITAL ENCOUNTER (EMERGENCY)
Age: 48
Discharge: HOME | End: 2023-09-22
Attending: EMERGENCY MEDICINE
Payer: COMMERCIAL

## 2023-09-21 ENCOUNTER — APPOINTMENT (OUTPATIENT)
Dept: GENERAL RADIOLOGY | Age: 48
End: 2023-09-21
Payer: COMMERCIAL

## 2023-09-21 DIAGNOSIS — R10.84 GENERALIZED ABDOMINAL PAIN: Primary | ICD-10-CM

## 2023-09-21 DIAGNOSIS — R11.2 NAUSEA AND VOMITING, UNSPECIFIED VOMITING TYPE: ICD-10-CM

## 2023-09-21 DIAGNOSIS — R07.89 ATYPICAL CHEST PAIN: ICD-10-CM

## 2023-09-21 LAB
ALBUMIN SERPL-MCNC: 4 GM/DL (ref 3.4–5)
ALP BLD-CCNC: 66 IU/L (ref 40–129)
ALT SERPL-CCNC: 11 U/L (ref 10–40)
ANION GAP SERPL CALCULATED.3IONS-SCNC: 11 MMOL/L (ref 4–16)
AST SERPL-CCNC: 17 IU/L (ref 15–37)
BASOPHILS ABSOLUTE: 0 K/CU MM
BASOPHILS RELATIVE PERCENT: 0.4 % (ref 0–1)
BILIRUB SERPL-MCNC: 0.1 MG/DL (ref 0–1)
BUN SERPL-MCNC: 8 MG/DL (ref 6–23)
CALCIUM SERPL-MCNC: 9.5 MG/DL (ref 8.3–10.6)
CHLORIDE BLD-SCNC: 103 MMOL/L (ref 99–110)
CO2: 22 MMOL/L (ref 21–32)
CREAT SERPL-MCNC: 0.6 MG/DL (ref 0.6–1.1)
DIFFERENTIAL TYPE: ABNORMAL
EOSINOPHILS ABSOLUTE: 0.1 K/CU MM
EOSINOPHILS RELATIVE PERCENT: 1 % (ref 0–3)
GFR SERPL CREATININE-BSD FRML MDRD: >60 ML/MIN/1.73M2
GLUCOSE SERPL-MCNC: 95 MG/DL (ref 70–99)
HCT VFR BLD CALC: 36.3 % (ref 37–47)
HEMOGLOBIN: 12 GM/DL (ref 12.5–16)
IMMATURE NEUTROPHIL %: 0.3 % (ref 0–0.43)
LYMPHOCYTES ABSOLUTE: 2.2 K/CU MM
LYMPHOCYTES RELATIVE PERCENT: 30.2 % (ref 24–44)
MCH RBC QN AUTO: 31.8 PG (ref 27–31)
MCHC RBC AUTO-ENTMCNC: 33.1 % (ref 32–36)
MCV RBC AUTO: 96.3 FL (ref 78–100)
MONOCYTES ABSOLUTE: 0.3 K/CU MM
MONOCYTES RELATIVE PERCENT: 4.4 % (ref 0–4)
NUCLEATED RBC %: 0 %
PDW BLD-RTO: 13.1 % (ref 11.7–14.9)
PLATELET # BLD: 256 K/CU MM (ref 140–440)
PMV BLD AUTO: 10.9 FL (ref 7.5–11.1)
POTASSIUM SERPL-SCNC: 3.7 MMOL/L (ref 3.5–5.1)
RBC # BLD: 3.77 M/CU MM (ref 4.2–5.4)
SEGMENTED NEUTROPHILS ABSOLUTE COUNT: 4.7 K/CU MM
SEGMENTED NEUTROPHILS RELATIVE PERCENT: 63.7 % (ref 36–66)
SODIUM BLD-SCNC: 136 MMOL/L (ref 135–145)
TOTAL IMMATURE NEUTOROPHIL: 0.02 K/CU MM
TOTAL NUCLEATED RBC: 0 K/CU MM
TOTAL PROTEIN: 7 GM/DL (ref 6.4–8.2)
WBC # BLD: 7.3 K/CU MM (ref 4–10.5)

## 2023-09-21 PROCEDURE — 96375 TX/PRO/DX INJ NEW DRUG ADDON: CPT

## 2023-09-21 PROCEDURE — 80053 COMPREHEN METABOLIC PANEL: CPT

## 2023-09-21 PROCEDURE — 84484 ASSAY OF TROPONIN QUANT: CPT

## 2023-09-21 PROCEDURE — 99285 EMERGENCY DEPT VISIT HI MDM: CPT

## 2023-09-21 PROCEDURE — 96374 THER/PROPH/DIAG INJ IV PUSH: CPT

## 2023-09-21 PROCEDURE — 84702 CHORIONIC GONADOTROPIN TEST: CPT

## 2023-09-21 PROCEDURE — 93005 ELECTROCARDIOGRAM TRACING: CPT | Performed by: EMERGENCY MEDICINE

## 2023-09-21 PROCEDURE — 71045 X-RAY EXAM CHEST 1 VIEW: CPT

## 2023-09-21 PROCEDURE — 85025 COMPLETE CBC W/AUTO DIFF WBC: CPT

## 2023-09-21 PROCEDURE — 83880 ASSAY OF NATRIURETIC PEPTIDE: CPT

## 2023-09-22 VITALS
DIASTOLIC BLOOD PRESSURE: 77 MMHG | RESPIRATION RATE: 20 BRPM | BODY MASS INDEX: 37.12 KG/M2 | OXYGEN SATURATION: 100 % | TEMPERATURE: 98.9 F | WEIGHT: 230 LBS | HEART RATE: 94 BPM | SYSTOLIC BLOOD PRESSURE: 130 MMHG

## 2023-09-22 LAB
EKG ATRIAL RATE: 82 BPM
EKG DIAGNOSIS: NORMAL
EKG P AXIS: 42 DEGREES
EKG P-R INTERVAL: 148 MS
EKG Q-T INTERVAL: 368 MS
EKG QRS DURATION: 78 MS
EKG QTC CALCULATION (BAZETT): 429 MS
EKG R AXIS: 10 DEGREES
EKG T AXIS: 16 DEGREES
EKG VENTRICULAR RATE: 82 BPM
GONADOTROPIN, CHORIONIC (HCG) QUANT: 1.1 UIU/ML
PRO-BNP: 46.34 PG/ML
TROPONIN T: <0.01 NG/ML

## 2023-09-22 PROCEDURE — 96374 THER/PROPH/DIAG INJ IV PUSH: CPT

## 2023-09-22 PROCEDURE — C9113 INJ PANTOPRAZOLE SODIUM, VIA: HCPCS | Performed by: EMERGENCY MEDICINE

## 2023-09-22 PROCEDURE — 6360000002 HC RX W HCPCS: Performed by: EMERGENCY MEDICINE

## 2023-09-22 PROCEDURE — 6370000000 HC RX 637 (ALT 250 FOR IP): Performed by: EMERGENCY MEDICINE

## 2023-09-22 PROCEDURE — 96375 TX/PRO/DX INJ NEW DRUG ADDON: CPT

## 2023-09-22 PROCEDURE — 2500000003 HC RX 250 WO HCPCS: Performed by: EMERGENCY MEDICINE

## 2023-09-22 PROCEDURE — 93010 ELECTROCARDIOGRAM REPORT: CPT | Performed by: INTERNAL MEDICINE

## 2023-09-22 RX ORDER — FAMOTIDINE 20 MG/1
20 TABLET, FILM COATED ORAL 2 TIMES DAILY
Qty: 60 TABLET | Refills: 0 | Status: SHIPPED | OUTPATIENT
Start: 2023-09-22

## 2023-09-22 RX ORDER — FAMOTIDINE 10 MG/ML
20 INJECTION, SOLUTION INTRAVENOUS ONCE
Status: COMPLETED | OUTPATIENT
Start: 2023-09-22 | End: 2023-09-22

## 2023-09-22 RX ORDER — DICYCLOMINE HCL 20 MG
20 TABLET ORAL 4 TIMES DAILY
Qty: 30 TABLET | Refills: 1 | Status: SHIPPED | OUTPATIENT
Start: 2023-09-22

## 2023-09-22 RX ORDER — ONDANSETRON 2 MG/ML
4 INJECTION INTRAMUSCULAR; INTRAVENOUS ONCE
Status: COMPLETED | OUTPATIENT
Start: 2023-09-22 | End: 2023-09-22

## 2023-09-22 RX ORDER — DICYCLOMINE HCL 20 MG
20 TABLET ORAL ONCE
Status: COMPLETED | OUTPATIENT
Start: 2023-09-22 | End: 2023-09-22

## 2023-09-22 RX ORDER — ONDANSETRON 4 MG/1
4 TABLET, ORALLY DISINTEGRATING ORAL 3 TIMES DAILY PRN
Qty: 21 TABLET | Refills: 0 | Status: SHIPPED | OUTPATIENT
Start: 2023-09-22

## 2023-09-22 RX ORDER — PANTOPRAZOLE SODIUM 40 MG/10ML
40 INJECTION, POWDER, LYOPHILIZED, FOR SOLUTION INTRAVENOUS ONCE
Status: COMPLETED | OUTPATIENT
Start: 2023-09-22 | End: 2023-09-22

## 2023-09-22 RX ADMIN — PANTOPRAZOLE SODIUM 40 MG: 40 INJECTION, POWDER, FOR SOLUTION INTRAVENOUS at 01:29

## 2023-09-22 RX ADMIN — ONDANSETRON 4 MG: 2 INJECTION INTRAMUSCULAR; INTRAVENOUS at 01:31

## 2023-09-22 RX ADMIN — DICYCLOMINE HYDROCHLORIDE 20 MG: 20 TABLET ORAL at 01:28

## 2023-09-22 RX ADMIN — FAMOTIDINE 20 MG: 10 INJECTION INTRAVENOUS at 01:33

## 2023-09-22 ASSESSMENT — HEART SCORE: ECG: 0

## 2023-09-22 ASSESSMENT — PAIN DESCRIPTION - LOCATION: LOCATION: ABDOMEN

## 2023-09-22 ASSESSMENT — PAIN SCALES - GENERAL: PAINLEVEL_OUTOF10: 5

## 2023-09-22 ASSESSMENT — PAIN DESCRIPTION - DESCRIPTORS: DESCRIPTORS: ACHING

## 2023-09-22 NOTE — ED PROVIDER NOTES
12 lead EKG per my interpretation:  Normal Sinus Rhythm 82  Axis is   Normal  QTc is   429  There is no specific T wave changes appreciated. There is no specific ST wave changes appreciated.     Prior EKG to compare with was not available        Shyann Carmona DO  09/21/23 0621

## 2023-09-22 NOTE — ED PROVIDER NOTES
Emergency Department Encounter    Patient: Kyle Matta  MRN: [id number]  : 1975  Date of Evaluation: 2023  ED Provider:  Tram Ferrell DO    Triage Chief Complaint:   Chest Pain    Circle:  Kyle Matta is a 51 y.o. female with history of sleep apnea obesity acid reflux, migraine that presents to the emergency department complaining of abdominal pain. Patient states she started having some upper abdominal pain that started after eating [employer] at 6 PM.  Patient states she went for a walk after eating and it came back abdominal pain starting 5 out of 10 on the pain scale. She states the pain went up into her chest.  She states she vomited least 7 times. Denies any diarrhea. Denies any abdominal surgeries up for tubal ligation. Patient states she does have a history of acid reflux without any medications. She denies any dysuria hematuria fever chills cough. Patient with known heart disease stents open heart surgery heart attack. Patient states no burning in the throat.     ROS - see HPI, below listed is current ROS at time of my eval:  General:  No fevers, no chills, no weakness  Eyes:  No recent vison changes, no discharge  ENT:  No sore throat, no nasal congestion, no hearing changes  Cardiovascular:  No chest pain, no palpitations  Respiratory:  No shortness of breath, no cough, no wheezing  Gastrointestinal:  No pain, no nausea, no vomiting, no diarrhea  Musculoskeletal:  No muscle pain, no joint pain  Skin:  No rash, no pruritis, no easy bruising  Neurologic:  No speech problems, no headache, no extremity numbness, no extremity tingling, no extremity weakness  Psychiatric:  No anxiety  Genitourinary:  No dysuria, no hematuria  Endocrine:  No unexpected weight gain, no unexpected weight loss  Extremities:  no edema, no pain    Past Medical History:   Diagnosis Date    Helicobacter pylori (H. pylori) 12    Migraines     Reflux      Past Surgical History:   Procedure Laterality 368 ms    QTc Calculation (Bazett) 429 ms    P Axis 42 degrees    R Axis 10 degrees    T Axis 16 degrees    Diagnosis       Normal sinus rhythm  Possible Left atrial enlargement  Borderline ECG  When compared with ECG of 01-JUN-2020 19:47,  No significant change was found  Confirmed by Hernán Alonzo ([id number]) on 9/22/2023 7:33:36 PM        Radiographs (if obtained):  Radiologist's Report Reviewed:  XR CHEST PORTABLE   Final Result   No radiographic evidence of acute cardiopulmonary disease. EKG (if obtained): (All EKG's are interpreted by myself in the absence of a cardiologist)  Medications   famotidine (PEPCID) injection 20 mg (20 mg IntraVENous Given 9/22/23 0133)   pantoprazole (PROTONIX) injection 40 mg (40 mg IntraVENous Given 9/22/23 0129)   ondansetron (ZOFRAN) injection 4 mg (4 mg IntraVENous Given 9/22/23 0131)   dicyclomine (BENTYL) tablet 20 mg (20 mg Oral Given 9/22/23 0128)         MDM:  Corbin Carrasco is a 51 y.o. female with history of sleep apnea obesity acid reflux, migraine that presents to the emergency department complaining of abdominal pain. Patient states she started having some upper abdominal pain that started after eating [employer] at 6 PM.  Patient states she went for a walk after eating and it came back abdominal pain starting 5 out of 10 on the pain scale. She states the pain went up into her chest.  She states she vomited least 7 times. Denies any diarrhea. On physical exam patient with upper abdominal discomfort no rebound guarding or rigidity. Differential diagnoses include pancreatitis, peptic ulcer disease, gastritis, acute cholecystitis, myocardial infarction. Patient is ordered laboratory studies chest x-ray EKG, Pepcid 20 mg IV, Protonix 40 mg IV, Zofran 4 mg IV, Bentyl 20 mg p.o. I did not feel patient needed any abdominal imaging studies abdomen benign. Patient with normal white blood cell count and platelets.   Patient hemoglobin 12, normal sodium

## 2023-09-22 NOTE — DISCHARGE INSTRUCTIONS
Follow-up with primary care physician in 3 days for reevaluation. Call for an appointment  Take Bentyl as prescribed for abdominal cramps and spasm  Take Zofran as needed for nausea vomiting  Take Pepcid as prescribed for acid reflux also gastritis  Return to the emergency department immediately pain fever chills nausea vomiting dizzy lightheadedness or any worsening symptoms.

## 2023-10-21 ENCOUNTER — APPOINTMENT (OUTPATIENT)
Dept: ULTRASOUND IMAGING | Age: 48
End: 2023-10-21
Payer: COMMERCIAL

## 2023-10-21 ENCOUNTER — HOSPITAL ENCOUNTER (EMERGENCY)
Age: 48
Discharge: HOME | End: 2023-10-21
Payer: COMMERCIAL

## 2023-10-21 VITALS
HEIGHT: 66 IN | HEART RATE: 87 BPM | BODY MASS INDEX: 38.89 KG/M2 | SYSTOLIC BLOOD PRESSURE: 123 MMHG | TEMPERATURE: 98 F | RESPIRATION RATE: 16 BRPM | DIASTOLIC BLOOD PRESSURE: 78 MMHG | OXYGEN SATURATION: 100 % | WEIGHT: 242 LBS

## 2023-10-21 DIAGNOSIS — N93.9 VAGINAL BLEEDING: Primary | ICD-10-CM

## 2023-10-21 DIAGNOSIS — D64.9 ANEMIA, UNSPECIFIED TYPE: ICD-10-CM

## 2023-10-21 LAB
ALBUMIN SERPL-MCNC: 3.8 GM/DL (ref 3.4–5)
ALP BLD-CCNC: 77 IU/L (ref 40–129)
ALT SERPL-CCNC: 9 U/L (ref 10–40)
ANION GAP SERPL CALCULATED.3IONS-SCNC: 9 MMOL/L (ref 4–16)
AST SERPL-CCNC: 18 IU/L (ref 15–37)
BACTERIA: NEGATIVE /HPF
BASOPHILS ABSOLUTE: 0 K/CU MM
BASOPHILS RELATIVE PERCENT: 0.6 % (ref 0–1)
BILIRUB SERPL-MCNC: 0.1 MG/DL (ref 0–1)
BILIRUBIN URINE: NEGATIVE MG/DL
BLOOD, URINE: ABNORMAL
BUN SERPL-MCNC: 8 MG/DL (ref 6–23)
CALCIUM SERPL-MCNC: 9.2 MG/DL (ref 8.3–10.6)
CHLORIDE BLD-SCNC: 107 MMOL/L (ref 99–110)
CLARITY: CLEAR
CO2: 24 MMOL/L (ref 21–32)
COLOR: YELLOW
CREAT SERPL-MCNC: 1 MG/DL (ref 0.6–1.1)
DIFFERENTIAL TYPE: ABNORMAL
EOSINOPHILS ABSOLUTE: 0.2 K/CU MM
EOSINOPHILS RELATIVE PERCENT: 2.8 % (ref 0–3)
GFR SERPL CREATININE-BSD FRML MDRD: >60 ML/MIN/1.73M2
GLUCOSE SERPL-MCNC: 94 MG/DL (ref 70–99)
GLUCOSE, URINE: NEGATIVE MG/DL
HCG, URINE, POC: NEGATIVE
HCT VFR BLD CALC: 34.5 % (ref 37–47)
HEMOGLOBIN: 11.1 GM/DL (ref 12.5–16)
IMMATURE NEUTROPHIL %: 0.2 % (ref 0–0.43)
KETONES, URINE: ABNORMAL MG/DL
LEUKOCYTE ESTERASE, URINE: NEGATIVE
LYMPHOCYTES ABSOLUTE: 3.3 K/CU MM
LYMPHOCYTES RELATIVE PERCENT: 50.9 % (ref 24–44)
Lab: NORMAL
MCH RBC QN AUTO: 31.3 PG (ref 27–31)
MCHC RBC AUTO-ENTMCNC: 32.2 % (ref 32–36)
MCV RBC AUTO: 97.2 FL (ref 78–100)
MONOCYTES ABSOLUTE: 0.3 K/CU MM
MONOCYTES RELATIVE PERCENT: 4.3 % (ref 0–4)
MUCUS: ABNORMAL HPF
NEGATIVE QC PASS/FAIL: NORMAL
NITRITE URINE, QUANTITATIVE: NEGATIVE
NUCLEATED RBC %: 0 %
PDW BLD-RTO: 13.1 % (ref 11.7–14.9)
PH, URINE: 7.5 (ref 5–8)
PLATELET # BLD: 309 K/CU MM (ref 140–440)
PMV BLD AUTO: 10.1 FL (ref 7.5–11.1)
POSITIVE QC PASS/FAIL: NORMAL
POTASSIUM SERPL-SCNC: 4.1 MMOL/L (ref 3.5–5.1)
PREGNANCY TEST URINE, POC: NEGATIVE
PROTEIN UA: NEGATIVE MG/DL
RBC # BLD: 3.55 M/CU MM (ref 4.2–5.4)
RBC URINE: 299 /HPF (ref 0–6)
SEGMENTED NEUTROPHILS ABSOLUTE COUNT: 2.7 K/CU MM
SEGMENTED NEUTROPHILS RELATIVE PERCENT: 41.2 % (ref 36–66)
SODIUM BLD-SCNC: 140 MMOL/L (ref 135–145)
SPECIFIC GRAVITY UA: 1.01 (ref 1–1.03)
SQUAMOUS EPITHELIAL: <1 /HPF
TOTAL IMMATURE NEUTOROPHIL: 0.01 K/CU MM
TOTAL NUCLEATED RBC: 0 K/CU MM
TOTAL PROTEIN: 7.1 GM/DL (ref 6.4–8.2)
TRICHOMONAS: ABNORMAL /HPF
UROBILINOGEN, URINE: 0.2 MG/DL (ref 0.2–1)
WBC # BLD: 6.5 K/CU MM (ref 4–10.5)
WBC UA: <1 /HPF (ref 0–5)

## 2023-10-21 PROCEDURE — 80053 COMPREHEN METABOLIC PANEL: CPT

## 2023-10-21 PROCEDURE — 81025 URINE PREGNANCY TEST: CPT

## 2023-10-21 PROCEDURE — 76830 TRANSVAGINAL US NON-OB: CPT

## 2023-10-21 PROCEDURE — 85025 COMPLETE CBC W/AUTO DIFF WBC: CPT

## 2023-10-21 PROCEDURE — 81001 URINALYSIS AUTO W/SCOPE: CPT

## 2023-10-21 PROCEDURE — 93975 VASCULAR STUDY: CPT

## 2023-10-21 PROCEDURE — 99284 EMERGENCY DEPT VISIT MOD MDM: CPT

## 2023-10-21 ASSESSMENT — PAIN DESCRIPTION - ORIENTATION: ORIENTATION: LOWER

## 2023-10-21 ASSESSMENT — PAIN DESCRIPTION - PAIN TYPE: TYPE: ACUTE PAIN

## 2023-10-21 ASSESSMENT — PAIN SCALES - GENERAL: PAINLEVEL_OUTOF10: 8

## 2023-10-21 ASSESSMENT — PAIN DESCRIPTION - FREQUENCY: FREQUENCY: CONTINUOUS

## 2023-10-21 ASSESSMENT — PAIN DESCRIPTION - LOCATION: LOCATION: ABDOMEN

## 2023-10-21 ASSESSMENT — PAIN DESCRIPTION - DESCRIPTORS: DESCRIPTORS: CRAMPING

## 2023-10-22 ENCOUNTER — HOSPITAL ENCOUNTER (EMERGENCY)
Age: 48
Discharge: HOME | End: 2023-10-22
Attending: EMERGENCY MEDICINE
Payer: COMMERCIAL

## 2023-10-22 VITALS
RESPIRATION RATE: 18 BRPM | WEIGHT: 238 LBS | OXYGEN SATURATION: 100 % | HEIGHT: 66 IN | BODY MASS INDEX: 38.25 KG/M2 | DIASTOLIC BLOOD PRESSURE: 98 MMHG | HEART RATE: 95 BPM | SYSTOLIC BLOOD PRESSURE: 134 MMHG | TEMPERATURE: 98.6 F

## 2023-10-22 DIAGNOSIS — N93.8 DYSFUNCTIONAL UTERINE BLEEDING: Primary | ICD-10-CM

## 2023-10-22 LAB
BACTERIA: NEGATIVE /HPF
BASOPHILS ABSOLUTE: 0 K/CU MM
BASOPHILS RELATIVE PERCENT: 0.6 % (ref 0–1)
BILIRUBIN URINE: NEGATIVE MG/DL
BLOOD, URINE: ABNORMAL
CAST TYPE: ABNORMAL /HPF
CLARITY: CLEAR
COLOR: YELLOW
CRYSTAL TYPE: NEGATIVE /HPF
DIFFERENTIAL TYPE: ABNORMAL
EOSINOPHILS ABSOLUTE: 0.2 K/CU MM
EOSINOPHILS RELATIVE PERCENT: 3.3 % (ref 0–3)
EPITHELIAL CELLS, UA: 2 /HPF
GLUCOSE, URINE: NEGATIVE MG/DL
HCT VFR BLD CALC: 36.5 % (ref 37–47)
HEMOGLOBIN: 11.6 GM/DL (ref 12.5–16)
IMMATURE NEUTROPHIL %: 0.1 % (ref 0–0.43)
INTERPRETATION: NORMAL
KETONES, URINE: NEGATIVE MG/DL
LEUKOCYTE ESTERASE, URINE: NEGATIVE
LYMPHOCYTES ABSOLUTE: 3.5 K/CU MM
LYMPHOCYTES RELATIVE PERCENT: 51.8 % (ref 24–44)
MCH RBC QN AUTO: 30.9 PG (ref 27–31)
MCHC RBC AUTO-ENTMCNC: 31.8 % (ref 32–36)
MCV RBC AUTO: 97.1 FL (ref 78–100)
MONOCYTES ABSOLUTE: 0.2 K/CU MM
MONOCYTES RELATIVE PERCENT: 3.6 % (ref 0–4)
NITRITE URINE, QUANTITATIVE: NEGATIVE
PDW BLD-RTO: 13.2 % (ref 11.7–14.9)
PH, URINE: 6 (ref 5–8)
PLATELET # BLD: 317 K/CU MM (ref 140–440)
PMV BLD AUTO: 10 FL (ref 7.5–11.1)
PREGNANCY, URINE: NEGATIVE
PROTEIN UA: NEGATIVE MG/DL
RBC # BLD: 3.76 M/CU MM (ref 4.2–5.4)
RBC URINE: 20 /HPF (ref 0–6)
SEGMENTED NEUTROPHILS ABSOLUTE COUNT: 2.7 K/CU MM
SEGMENTED NEUTROPHILS RELATIVE PERCENT: 40.6 % (ref 36–66)
SPECIFIC GRAVITY UA: >1.03 (ref 1–1.03)
TOTAL IMMATURE NEUTOROPHIL: 0.01 K/CU MM
UROBILINOGEN, URINE: 0.2 MG/DL (ref 0.2–1)
WBC # BLD: 6.7 K/CU MM (ref 4–10.5)
WBC UA: NEGATIVE /HPF (ref 0–5)

## 2023-10-22 PROCEDURE — 85025 COMPLETE CBC W/AUTO DIFF WBC: CPT

## 2023-10-22 PROCEDURE — 99283 EMERGENCY DEPT VISIT LOW MDM: CPT

## 2023-10-22 PROCEDURE — 81001 URINALYSIS AUTO W/SCOPE: CPT

## 2023-10-22 PROCEDURE — 81025 URINE PREGNANCY TEST: CPT

## 2023-10-22 RX ORDER — TRANEXAMIC ACID 650 MG/1
1300 TABLET ORAL 3 TIMES DAILY PRN
Qty: 30 TABLET | Refills: 0 | Status: SHIPPED | OUTPATIENT
Start: 2023-10-22 | End: 2023-10-27

## 2023-10-22 RX ORDER — TRANEXAMIC ACID 650 MG/1
1300 TABLET ORAL 3 TIMES DAILY PRN
Qty: 30 TABLET | Refills: 0 | Status: SHIPPED | OUTPATIENT
Start: 2023-10-22 | End: 2023-10-22 | Stop reason: SDUPTHER

## 2023-10-22 ASSESSMENT — PAIN - FUNCTIONAL ASSESSMENT: PAIN_FUNCTIONAL_ASSESSMENT: 0-10

## 2023-10-22 ASSESSMENT — PAIN DESCRIPTION - DESCRIPTORS: DESCRIPTORS: CRAMPING

## 2023-10-22 ASSESSMENT — PAIN SCALES - GENERAL: PAINLEVEL_OUTOF10: 8

## 2023-10-22 ASSESSMENT — PAIN DESCRIPTION - ORIENTATION: ORIENTATION: LOWER

## 2023-10-22 ASSESSMENT — PAIN DESCRIPTION - LOCATION: LOCATION: ABDOMEN

## 2023-10-22 NOTE — ED PROVIDER NOTES
and patient instructed on use. History from : Patient    Limitations to history : None    Patient was given the following medications:  Medications - No data to display    Independent Imaging Interpretation by me: NA    Chronic conditions affecting care: DUB    Discussion with Other Profesionals : None    Social Determinants : None    Records Reviewed : External ED Note including ED visit from yesterday for the same with reassuring work-up including ultrasound and labs. Disposition Considerations (tests considered but not done, Shared Decision Making, Pt Expectation of Test or Tx.):   Appropriate for outpatient management    I discussed specific signs and symptoms on when to return to the emergency department as well as the need for close outpatient follow-up. Questions sought and answered with the patient. They voice understanding and agree with plan. I am the Primary Clinician of Record. Is this patient to be included in the SEP-1 Core Measure due to severe sepsis or septic shock? No   Exclusion criteria - the patient is NOT to be included for SEP-1 Core Measure due to: Infection is not suspected                  Clinical Impression:  1. Dysfunctional uterine bleeding      Disposition referral (if applicable):  Rajiv Fitzgerald, [address] [place] [address]  [phone]    Schedule an appointment as soon as possible for a visit   [facility]    Disposition medications (if applicable):  Current Discharge Medication List        START taking these medications    Details   tranexamic acid (LYSTEDA) 650 MG TABS tablet Take 2 tablets by mouth 3 times daily as needed (heavy bleeding)  Qty: 30 tablet, Refills: 0             Comment: Please note this report has been produced using speech recognition software and may contain errors related to that system including errors in grammar, punctuation, and spelling, as well as words and phrases that may be inappropriate.  If there

## 2023-10-22 NOTE — DISCHARGE INSTRUCTIONS
As we discussed, please call OB/GYN in the morning to update them and to arrange for possible earlier appointment. Please take medication as prescribed only as needed for heavy vaginal bleeding.

## 2023-10-26 ENCOUNTER — OFFICE VISIT (OUTPATIENT)
Dept: BARIATRICS/WEIGHT MGMT | Age: 48
End: 2023-10-26
Payer: COMMERCIAL

## 2023-10-26 VITALS
DIASTOLIC BLOOD PRESSURE: 82 MMHG | SYSTOLIC BLOOD PRESSURE: 124 MMHG | HEIGHT: 67 IN | HEART RATE: 88 BPM | BODY MASS INDEX: 37.34 KG/M2 | OXYGEN SATURATION: 97 % | WEIGHT: 237.9 LBS

## 2023-10-26 DIAGNOSIS — E66.9 OBESITY (BMI 30-39.9): Primary | ICD-10-CM

## 2023-10-26 DIAGNOSIS — Z79.899 MEDICATION MANAGEMENT: ICD-10-CM

## 2023-10-26 PROCEDURE — 1036F TOBACCO NON-USER: CPT | Performed by: NURSE PRACTITIONER

## 2023-10-26 PROCEDURE — G8427 DOCREV CUR MEDS BY ELIG CLIN: HCPCS | Performed by: NURSE PRACTITIONER

## 2023-10-26 PROCEDURE — G8484 FLU IMMUNIZE NO ADMIN: HCPCS | Performed by: NURSE PRACTITIONER

## 2023-10-26 PROCEDURE — G8417 CALC BMI ABV UP PARAM F/U: HCPCS | Performed by: NURSE PRACTITIONER

## 2023-10-26 PROCEDURE — 99214 OFFICE O/P EST MOD 30 MIN: CPT | Performed by: NURSE PRACTITIONER

## 2023-10-26 RX ORDER — PHENTERMINE HYDROCHLORIDE 37.5 MG/1
37.5 TABLET ORAL
Qty: 30 TABLET | Refills: 0 | Status: SHIPPED | OUTPATIENT
Start: 2023-10-26 | End: 2023-11-25

## 2023-11-30 ENCOUNTER — OFFICE VISIT (OUTPATIENT)
Dept: BARIATRICS/WEIGHT MGMT | Age: 48
End: 2023-11-30
Payer: COMMERCIAL

## 2023-11-30 VITALS
WEIGHT: 238.3 LBS | HEIGHT: 67 IN | BODY MASS INDEX: 37.4 KG/M2 | DIASTOLIC BLOOD PRESSURE: 80 MMHG | SYSTOLIC BLOOD PRESSURE: 120 MMHG | HEART RATE: 78 BPM | OXYGEN SATURATION: 100 %

## 2023-11-30 DIAGNOSIS — E66.9 OBESITY (BMI 30-39.9): Primary | ICD-10-CM

## 2023-11-30 PROCEDURE — G8484 FLU IMMUNIZE NO ADMIN: HCPCS | Performed by: NURSE PRACTITIONER

## 2023-11-30 PROCEDURE — 1036F TOBACCO NON-USER: CPT | Performed by: NURSE PRACTITIONER

## 2023-11-30 PROCEDURE — G8427 DOCREV CUR MEDS BY ELIG CLIN: HCPCS | Performed by: NURSE PRACTITIONER

## 2023-11-30 PROCEDURE — 99213 OFFICE O/P EST LOW 20 MIN: CPT | Performed by: NURSE PRACTITIONER

## 2023-11-30 PROCEDURE — G8417 CALC BMI ABV UP PARAM F/U: HCPCS | Performed by: NURSE PRACTITIONER

## 2023-11-30 RX ORDER — PHENTERMINE HYDROCHLORIDE 37.5 MG/1
37.5 TABLET ORAL
Qty: 30 TABLET | Refills: 0 | Status: SHIPPED | OUTPATIENT
Start: 2023-11-30 | End: 2023-12-30

## 2023-11-30 ASSESSMENT — ENCOUNTER SYMPTOMS
WHEEZING: 0
DIARRHEA: 0
BACK PAIN: 0
CHEST TIGHTNESS: 0
NAUSEA: 0
SHORTNESS OF BREATH: 0
APNEA: 0
SORE THROAT: 0
COLOR CHANGE: 0
PHOTOPHOBIA: 0

## 2024-01-04 ENCOUNTER — OFFICE VISIT (OUTPATIENT)
Dept: BARIATRICS/WEIGHT MGMT | Age: 49
End: 2024-01-04
Payer: COMMERCIAL

## 2024-01-04 VITALS
OXYGEN SATURATION: 98 % | BODY MASS INDEX: 36.58 KG/M2 | DIASTOLIC BLOOD PRESSURE: 86 MMHG | SYSTOLIC BLOOD PRESSURE: 122 MMHG | HEART RATE: 98 BPM | WEIGHT: 233.1 LBS | HEIGHT: 67 IN

## 2024-01-04 DIAGNOSIS — Z79.899 MEDICATION MANAGEMENT: ICD-10-CM

## 2024-01-04 DIAGNOSIS — E66.9 OBESITY (BMI 30-39.9): Primary | ICD-10-CM

## 2024-01-04 PROCEDURE — G8417 CALC BMI ABV UP PARAM F/U: HCPCS | Performed by: NURSE PRACTITIONER

## 2024-01-04 PROCEDURE — 99214 OFFICE O/P EST MOD 30 MIN: CPT | Performed by: NURSE PRACTITIONER

## 2024-01-04 PROCEDURE — G8427 DOCREV CUR MEDS BY ELIG CLIN: HCPCS | Performed by: NURSE PRACTITIONER

## 2024-01-04 PROCEDURE — G8484 FLU IMMUNIZE NO ADMIN: HCPCS | Performed by: NURSE PRACTITIONER

## 2024-01-04 PROCEDURE — 1036F TOBACCO NON-USER: CPT | Performed by: NURSE PRACTITIONER

## 2024-01-04 RX ORDER — PHENTERMINE HYDROCHLORIDE 37.5 MG/1
37.5 TABLET ORAL
Qty: 30 TABLET | Refills: 0 | Status: SHIPPED | OUTPATIENT
Start: 2024-01-04 | End: 2024-02-03

## 2024-01-04 NOTE — PROGRESS NOTES
month      PLAN:    -Continue Adipex. RX REFILLED.    -BMI is over 30, or over 27 with weight-related risk factors.     -Patient demonstrated weight loss since last visit.    -Patient aware that weight must be lost at each visit or medication will be discontinued.     - Patient must lose 5% of body weight every 3 months to remain on medication.    -Patient is aware that in order to be successful, must combine Adipex with appropriate diet and exercise plan.     -Pt aware must continue to meet monthly in person while on this medication. F/u in one month.    -Check [facility] Reporting System. Any concerns: NONE Reported     -Current birth control measures include: tubal ligation    -If elderly or renal impairment, will use lower dose (15 mg daily) and avoid all together if GFR is less than 15. N/A      No orders of the defined types were placed in this encounter.       Orders Placed This Encounter   Medications    phentermine (ADIPEX-P) 37.5 MG tablet     Sig: Take 1 tablet by mouth every morning (before breakfast) for 30 days. BMI 44. Max Daily Amount: 37.5 mg     Dispense:  30 tablet     Refill:  0        Follow Up:  Return in about 1 month (around 2/4/2024).      JACKIE Blake - CNP

## 2024-01-31 ENCOUNTER — OFFICE VISIT (OUTPATIENT)
Dept: BARIATRICS/WEIGHT MGMT | Age: 49
End: 2024-01-31
Payer: COMMERCIAL

## 2024-01-31 VITALS
SYSTOLIC BLOOD PRESSURE: 122 MMHG | OXYGEN SATURATION: 99 % | HEART RATE: 94 BPM | HEIGHT: 67 IN | WEIGHT: 236.9 LBS | DIASTOLIC BLOOD PRESSURE: 86 MMHG | BODY MASS INDEX: 37.18 KG/M2

## 2024-01-31 DIAGNOSIS — E66.9 OBESITY (BMI 30-39.9): Primary | ICD-10-CM

## 2024-01-31 DIAGNOSIS — Z79.899 MEDICATION MANAGEMENT: ICD-10-CM

## 2024-01-31 PROCEDURE — G8417 CALC BMI ABV UP PARAM F/U: HCPCS | Performed by: NURSE PRACTITIONER

## 2024-01-31 PROCEDURE — G8484 FLU IMMUNIZE NO ADMIN: HCPCS | Performed by: NURSE PRACTITIONER

## 2024-01-31 PROCEDURE — G8427 DOCREV CUR MEDS BY ELIG CLIN: HCPCS | Performed by: NURSE PRACTITIONER

## 2024-01-31 PROCEDURE — 1036F TOBACCO NON-USER: CPT | Performed by: NURSE PRACTITIONER

## 2024-01-31 PROCEDURE — 99214 OFFICE O/P EST MOD 30 MIN: CPT | Performed by: NURSE PRACTITIONER

## 2024-01-31 NOTE — PROGRESS NOTES
Chief Complaint   Patient presents with    Weight Management     Med WM Adipex # 4         SUBJECTIVE:    HPI: Patient is here with complaints of: Monthly Adipex visit.    Obesity with a BMI of Body mass index is 37.66 kg/m²..    Current weight: 236 pounds     Weight change since last visit: +3.8 pounds (if pt failed to lose weight, cannot remain on medication).    Any new absolute contraindications (glaucoma, drug abuse, CAD, uncontrolled HTN, arrhythmias, stroke, CHF, hyperthyroidism, MAOI use, pregnant or breastfeeding) to being on medication: DENIES     Pt complains of the following side effects: DENIES    Current dietary measures: not tracking calories; portion control    Current exercise measures: walking     I have reviewed the patient's(pertinent information to this visit) medical history, family history(scanned in  the [employer] under \"patient questioner\"), social history and review of systems with the patient today in the office.          Past Surgical History:   Procedure Laterality Date    ABDOMEN SURGERY      TUBAL LIGATION         Past Medical History:   Diagnosis Date    Helicobacter pylori (H. pylori) 8/27/12    Migraines     Reflux        Family History   Problem Relation Age of Onset    Depression Mother     Stroke Father     Alcohol Abuse Maternal Uncle     Cancer Maternal Uncle         throat    Diabetes Maternal Grandmother     High Blood Pressure Maternal Grandmother     Mental Retardation Maternal Cousin     Allergy (Severe) Other     Learning Disabilities Other         son    Breast Cancer Neg Hx     Ovarian Cancer Neg Hx        Social History     Socioeconomic History    Marital status:      Spouse name: Not on file    Number of children: Not on file    Years of education: Not on file    Highest education level: Not on file   Occupational History    Not on file   Tobacco Use    Smoking status: Never     Passive exposure: Yes    Smokeless tobacco: Never    Tobacco comments:

## 2024-03-07 ENCOUNTER — OFFICE VISIT (OUTPATIENT)
Dept: BARIATRICS/WEIGHT MGMT | Age: 49
End: 2024-03-07
Payer: COMMERCIAL

## 2024-03-07 VITALS
SYSTOLIC BLOOD PRESSURE: 112 MMHG | DIASTOLIC BLOOD PRESSURE: 72 MMHG | HEART RATE: 98 BPM | OXYGEN SATURATION: 97 % | BODY MASS INDEX: 37.18 KG/M2 | HEIGHT: 67 IN | WEIGHT: 236.9 LBS

## 2024-03-07 DIAGNOSIS — E66.9 OBESITY (BMI 30-39.9): Primary | ICD-10-CM

## 2024-03-07 DIAGNOSIS — Z79.899 MEDICATION MANAGEMENT: ICD-10-CM

## 2024-03-07 PROCEDURE — G8427 DOCREV CUR MEDS BY ELIG CLIN: HCPCS | Performed by: NURSE PRACTITIONER

## 2024-03-07 PROCEDURE — 1036F TOBACCO NON-USER: CPT | Performed by: NURSE PRACTITIONER

## 2024-03-07 PROCEDURE — G8417 CALC BMI ABV UP PARAM F/U: HCPCS | Performed by: NURSE PRACTITIONER

## 2024-03-07 PROCEDURE — 99213 OFFICE O/P EST LOW 20 MIN: CPT | Performed by: NURSE PRACTITIONER

## 2024-03-07 PROCEDURE — G8484 FLU IMMUNIZE NO ADMIN: HCPCS | Performed by: NURSE PRACTITIONER

## 2024-03-07 NOTE — PROGRESS NOTES
Chief Complaint   Patient presents with    Follow-up     F/U Medication WM Visit - NEVER Started Qsymia         SUBJECTIVE:    HPI: Patient is here with complaints of: monthly Qsymia visit.    Obesity with a BMI of Body mass index is 37.66 kg/m²..    Current weight: 236 pounds    Weight change since last visit: 0 pounds    Patient did not start Qsymia as previously discussed.    Current dietary measures: not tracking calories; water intake good; tries to eat healthy snacks    Current exercise measures: walking more    I have reviewed the patient's(pertinent information to this visit) medical history, family history(scanned in  the [employer] under \"patient questioner\"), social history and review of systems with the patient today in the office.          Past Surgical History:   Procedure Laterality Date    ABDOMEN SURGERY      TUBAL LIGATION         Past Medical History:   Diagnosis Date    Helicobacter pylori (H. pylori) 8/27/12    Migraines     Reflux        Family History   Problem Relation Age of Onset    Depression Mother     Stroke Father     Alcohol Abuse Maternal Uncle     Cancer Maternal Uncle         throat    Diabetes Maternal Grandmother     High Blood Pressure Maternal Grandmother     Mental Retardation Maternal Cousin     Allergy (Severe) Other     Learning Disabilities Other         son    Breast Cancer Neg Hx     Ovarian Cancer Neg Hx        Social History     Socioeconomic History    Marital status:      Spouse name: Not on file    Number of children: Not on file    Years of education: Not on file    Highest education level: Not on file   Occupational History    Not on file   Tobacco Use    Smoking status: Never     Passive exposure: Yes    Smokeless tobacco: Never    Tobacco comments:      smokes in house    Vaping Use    Vaping Use: Never used   Substance and Sexual Activity    Alcohol use: Yes     Comment: rarely    Drug use: No    Sexual activity: Yes     Partners: Male   Other Topics

## 2025-05-30 ENCOUNTER — HOSPITAL ENCOUNTER (OUTPATIENT)
Dept: GENERAL RADIOLOGY | Age: 50
Discharge: HOME | End: 2025-05-30
Payer: COMMERCIAL

## 2025-05-30 ENCOUNTER — TRANSCRIBE ORDERS (OUTPATIENT)
Dept: GENERAL RADIOLOGY | Age: 50
End: 2025-05-30

## 2025-05-30 DIAGNOSIS — M25.562 CHRONIC PAIN OF LEFT KNEE: ICD-10-CM

## 2025-05-30 DIAGNOSIS — G89.29 CHRONIC PAIN OF LEFT KNEE: ICD-10-CM

## 2025-05-30 DIAGNOSIS — M25.562 CHRONIC PAIN OF LEFT KNEE: Primary | ICD-10-CM

## 2025-05-30 DIAGNOSIS — G89.29 CHRONIC PAIN OF LEFT KNEE: Primary | ICD-10-CM

## 2025-05-30 PROCEDURE — 73562 X-RAY EXAM OF KNEE 3: CPT

## 2025-06-30 ENCOUNTER — TRANSCRIBE ORDERS (OUTPATIENT)
Dept: ADMINISTRATIVE | Age: 50
End: 2025-06-30

## 2025-06-30 DIAGNOSIS — Z12.31 SCREENING MAMMOGRAM FOR BREAST CANCER: Primary | ICD-10-CM
